# Patient Record
Sex: FEMALE | Race: BLACK OR AFRICAN AMERICAN | NOT HISPANIC OR LATINO | Employment: UNEMPLOYED | URBAN - NONMETROPOLITAN AREA
[De-identification: names, ages, dates, MRNs, and addresses within clinical notes are randomized per-mention and may not be internally consistent; named-entity substitution may affect disease eponyms.]

---

## 2020-05-14 LAB
BACTERIA SPEC AEROBE CULT: NORMAL
CANNABINOIDS SERPL QL: POSITIVE
COCAINE SERPL CFM-MCNC: NEGATIVE NG/ML
EXTERNAL ABO GROUPING: NORMAL
EXTERNAL AMPHETAMINE SCREEN URINE: NEGATIVE
EXTERNAL ANTIBODY SCREEN: NORMAL
EXTERNAL BARBITURATE SCREEN URINE: NEGATIVE
EXTERNAL BENZODIAZEPINE SCREEN URINE: NEGATIVE
EXTERNAL HEMATOCRIT: 35 %
EXTERNAL HEMOGLOBIN: 11.9 G/DL
EXTERNAL HEPATITIS B SURFACE ANTIGEN: NEGATIVE
EXTERNAL PHENCYCLIDINE SCREEN URINE: NEGATIVE
EXTERNAL RH FACTOR: POSITIVE
HCV AB S/CO SERPL IA: NEGATIVE
HIV 1+2 AB+HIV1 P24 AG SERPL QL IA: NON REACTIVE
OPIATES UR QL: NEGATIVE
RUBV IGG SERPL IA-ACNC: NORMAL

## 2020-09-17 LAB
EXTERNAL HEMATOCRIT: 31 %
EXTERNAL HEMOGLOBIN: 10.8 G/DL
GLUCOSE 1H P 100 G GLC PO SERPL-MCNC: 122 MG/DL (ref 74–180)

## 2020-09-25 ENCOUNTER — TELEPHONE (OUTPATIENT)
Dept: OBSTETRICS AND GYNECOLOGY | Facility: CLINIC | Age: 30
End: 2020-09-25

## 2020-10-05 ENCOUNTER — LAB (OUTPATIENT)
Dept: LAB | Facility: HOSPITAL | Age: 30
End: 2020-10-05

## 2020-10-05 ENCOUNTER — INITIAL PRENATAL (OUTPATIENT)
Dept: OBSTETRICS AND GYNECOLOGY | Facility: CLINIC | Age: 30
End: 2020-10-05

## 2020-10-05 VITALS
HEIGHT: 62 IN | DIASTOLIC BLOOD PRESSURE: 86 MMHG | WEIGHT: 272 LBS | SYSTOLIC BLOOD PRESSURE: 126 MMHG | BODY MASS INDEX: 50.05 KG/M2

## 2020-10-05 DIAGNOSIS — O99.213 OBESITY AFFECTING PREGNANCY IN THIRD TRIMESTER: ICD-10-CM

## 2020-10-05 DIAGNOSIS — Z23 NEED FOR TDAP VACCINATION: ICD-10-CM

## 2020-10-05 DIAGNOSIS — O09.299 HX OF PREECLAMPSIA, PRIOR PREGNANCY, CURRENTLY PREGNANT: ICD-10-CM

## 2020-10-05 DIAGNOSIS — O09.299 HX OF PREECLAMPSIA, PRIOR PREGNANCY, CURRENTLY PREGNANT: Primary | ICD-10-CM

## 2020-10-05 DIAGNOSIS — O99.333 TOBACCO SMOKING AFFECTING PREGNANCY IN THIRD TRIMESTER: ICD-10-CM

## 2020-10-05 DIAGNOSIS — Z3A.30 30 WEEKS GESTATION OF PREGNANCY: ICD-10-CM

## 2020-10-05 DIAGNOSIS — F12.10 MARIJUANA ABUSE: ICD-10-CM

## 2020-10-05 DIAGNOSIS — O09.43 SUPERVISION OF HIGH-RISK PREGNANCY WITH GRAND MULTIPARITY IN THIRD TRIMESTER: ICD-10-CM

## 2020-10-05 DIAGNOSIS — O99.323 DRUG USE AFFECTING PREGNANCY IN THIRD TRIMESTER: ICD-10-CM

## 2020-10-05 PROCEDURE — 99203 OFFICE O/P NEW LOW 30 MIN: CPT | Performed by: NURSE PRACTITIONER

## 2020-10-05 PROCEDURE — 90715 TDAP VACCINE 7 YRS/> IM: CPT | Performed by: NURSE PRACTITIONER

## 2020-10-05 PROCEDURE — 80306 DRUG TEST PRSMV INSTRMNT: CPT | Performed by: NURSE PRACTITIONER

## 2020-10-05 PROCEDURE — 85027 COMPLETE CBC AUTOMATED: CPT

## 2020-10-05 PROCEDURE — 80053 COMPREHEN METABOLIC PANEL: CPT | Performed by: NURSE PRACTITIONER

## 2020-10-05 PROCEDURE — 90471 IMMUNIZATION ADMIN: CPT | Performed by: NURSE PRACTITIONER

## 2020-10-05 RX ORDER — NICOTINE POLACRILEX 4 MG/1
GUM, CHEWING ORAL
COMMUNITY
Start: 2020-09-17 | End: 2020-10-19 | Stop reason: SDUPTHER

## 2020-10-05 RX ORDER — PNV NO.95/FERROUS FUM/FOLIC AC 28MG-0.8MG
TABLET ORAL
COMMUNITY
End: 2021-08-13

## 2020-10-05 NOTE — PROGRESS NOTES
[facility]  Obstetrics Visit    CHIEF COMPLAINT:  New prenatal visit- Transfer from [facility]; hx reviewed and updated.     HISTORY OF PRESENT ILLNESS:  Jackelyn Lopez is a 30 y.o. y/o  at 30w3d by LMP (Patient's last menstrual period was 2020 (approximate).).  This was an unplanned pregnancy and the patient is supported by her s/o Reji Lundberg.  Reports no nausea or vomiting.  Reports breast tenderness.  She denies any vaginal bleeding.  She has started taking a prenatal vitamin.    REVIEW OF SYSTEMS  Review of Systems   Constitutional: Negative for activity change, appetite change, diaphoresis, fatigue, unexpected weight gain and unexpected weight loss.   Respiratory: Negative for chest tightness and shortness of breath.    Cardiovascular: Negative for chest pain and palpitations.   Gastrointestinal: Negative for abdominal distention, abdominal pain, constipation and diarrhea.   Genitourinary: Negative for breast discharge, breast lump, breast pain, decreased libido, dyspareunia, dysuria, menstrual problem, pelvic pain, vaginal bleeding, vaginal discharge and vaginal pain.   Musculoskeletal: Negative for myalgias.   Skin: Negative for color change, dry skin and skin lesions.   Neurological: Positive for headache. Negative for light-headedness.   Psychiatric/Behavioral: Negative for agitation, dysphoric mood, sleep disturbance, depressed mood and stress. The patient is not nervous/anxious.        PRENATAL RISK FACTORS  10/20 Problems (from 10/05/20 to present)     Problem Noted Resolved    Obesity affecting pregnancy in third trimester 10/5/2020 by Luz Elena Hernandez APRN No    Supervision of high-risk pregnancy with grand multiparity in third trimester 10/5/2020 by Luz Elena Hernandez APRN No    Overview Signed 10/5/2020  3:30 PM by Luz Elena Hernandez APRN     B pos/ Rubella immune / GBS 35wk  Dating: LMP  Genetics: declined  Tdap: given 10/5  Flu: Declined 10/5  Anatomy: [facility] - WNL with  limitations 2/2 body habitus; no f/u done  1h Glucola:  122 @ [facility]  H&H/Plts:   Lab Results   Component Value Date    HGB 10.8 2020    HCT 31 2020     Bottle/BC- uncertain; does not want tubal         Marijuana abuse 10/5/2020 by Luz Elena Hernandez APRN No    Drug use affecting pregnancy in third trimester 10/5/2020 by Luz Elena Hernandez APRN No    Tobacco smoking affecting pregnancy in third trimester 10/5/2020 by Luz Elena Hernandez APRN No    Hx of preeclampsia, prior pregnancy, currently pregnant 10/5/2020 by Luz Elena Hernandez APRN No    Overview Signed 10/5/2020  3:32 PM by Luz Elena Hernandez APRN     Hx of gHTN and preE in 5 other full term pregnancies  Pt to have growth/anatomy scan with [facility] in mid-October and await recommendations for f/u plan.               DATING CRITERIA:  LMP (3/6/2020) -- TAMELA 2020  1TUS Transfer in @ 30w3d    OBSTETRIC HISTORY:  OB History    Para Term  AB Living   9 6 6 0 2 6   SAB TAB Ectopic Molar Multiple Live Births   2         6      # Outcome Date GA Lbr Raheel/2nd Weight Sex Delivery Anes PTL Lv   9 Current            8 Term 18 38w0d  2722 g (6 lb) F Vag-Spont EPI N MILAN      Complications: Gestational hypertension   7 Term 18 37w0d  2722 g (6 lb) M Vag-Spont EPI N MILAN      Complications: Preeclampsia   6 Term 12/15/16 38w0d  3629 g (8 lb) F Vag-Spont EPI N MILAN      Complications: Gestational hypertension   5 Term 05/14/15 37w0d  2722 g (6 lb) F Vag-Spont EPI N MILAN      Complications: Preeclampsia   4 Term 07/30/10 37w0d  2722 g (6 lb) F Vag-Spont EPI N MILAN      Complications: Preeclampsia   3 Term 06 37w0d  2722 g (6 lb) F Vag-Spont EPI N MILAN   2 SAB            1 SAB              GYN HISTORY:  Denies h/o sexually transmitted infections/pelvic inflammatory disease  Denies h/o abnormal pap smears  Last pap smear:  per pt  Last Completed Pap Smear       Status Date      PAP SMEAR No completions recorded        Denies h/o gynecologic  "surgeries, including biopsies of the cervix    PAST MEDICAL HISTORY:  Past Medical History:   Diagnosis Date   • Chlamydia    • Gestational hypertension    • Preeclampsia    • Urogenital trichomoniasis      PAST SURGICAL HISTORY:  Past Surgical History:   Procedure Laterality Date   • CHOLECYSTECTOMY  2012     FAMILY HISTORY:  No family history on file.  SOCIAL HISTORY:  Social History     Socioeconomic History   • Marital status: Single     Spouse name: Not on file   • Number of children: Not on file   • Years of education: Not on file   • Highest education level: Not on file   Tobacco Use   • Smoking status: Current Every Day Smoker     Packs/day: 0.25     Years: 15.00     Pack years: 3.75   • Smokeless tobacco: Never Used   Substance and Sexual Activity   • Alcohol use: Not Currently   • Drug use: Yes     Types: Marijuana   • Sexual activity: Yes     Partners: Male     Birth control/protection: None     GENETIC SCREENING:  Age >36 yo as of TAMELA: no  Thalassemia: no  NTD: no  CHD: no  Down Syndrome/MR/Fragile X/Autism: no  Ashkenazi Rastafarian with Avelino-Sachs, [place], familial dysautonomia: no  Sickle cell disease or trait: no  Hemophilia: no  Muscular dystrophy: no  Cystic fibrosis: no  Shawano's chorea: no  Birth defects: no  Genetic/chromosomal disorders: no    INFECTION HISTORY:  TB exposure: no  HSV: no  Illness since LMP: no  Prior GBS infected child: no  STIs: chlamydia and trich    ALLERGIES:  Allergies   Allergen Reactions   • Azithromycin Hives and Nausea And Vomiting       MEDICATIONS:  Prior to Admission medications    Medication Sig Start Date End Date Taking? Authorizing Provider   Omeprazole 20 MG tablet delayed-release  9/17/20  Yes Isaac Castaneda MD   Prenatal Vit-Fe Fumarate-FA (Prenatal Vitamin and Mineral) 28-0.8 MG tablet Prenatal Vitamin tablet   Take 1 tablet every day by oral route for 90 days.    ProviderIsaac MD       PHYSICAL EXAM:   /86   Ht 157.5 cm (62\")   Wt 123 " kg (272 lb)   LMP 2020 (Approximate)   BMI 49.75 kg/m²   General: Alert, healthy, no distress, well nourished and well developed.  Neurologic: Alert, oriented to person, place, and time.  Gait normal.  Cranial nerves II-XII grossly intact.  HEENT: Normocephalic, atraumatic.  Extraocular muscles intact, pupils equal and reactive x2.    Teeth: Normal hygiene.  Neck: Supple, no adenopathy, thyroid normal size, non-tender, without nodularity, trachea midline.  Breasts: No masses, skin dimpling, skin retraction, nipple discharge, or asymmetry bilaterally.  Lungs: Normal respiratory effort.  Clear to auscultation bilaterally.  No wheezes, rhonci, or rales.  Heart: Regular rate and rhythm.  No murmer, rub or gallop.  Abdomen: Gravid, 34cm  Skin: No rash, no lesions.  Extremities: No cyanosis, clubbing or edema.  PELVIC EXAM:  Deferred.        IMPRESSION:  Jackelyn Lopez is a 30 y.o.  at 30w3d for a new prenatal visit.    PLAN:  1.  IUP at 30w3d  - Options counseling performed and patient desires continuation of pregnancy to term   - Prenatal labs ordered  - Genetic testing, including cystic fibrosis, was discussed and patient declined  - Continue prenatal vitamins  - Weight gain counseling performed.   - Pregravid BMI >30: Recommend 11-20 lb  - Return to clinic in 4 weeks for return prenatal visit  - Reviewed COVID-19 visitation policy  - Reviewed COVID-19 precautions     Diagnosis Plan   1. Hx of preeclampsia, prior pregnancy, currently pregnant  Comprehensive Metabolic Panel    CBC (No Diff)    Protein, Urine, 24 Hour - Urine, Clean Catch    US Ob Follow Up Transabdominal Approach   2. 30 weeks gestation of pregnancy  US Ob Follow Up Transabdominal Approach   3. Tobacco smoking affecting pregnancy in third trimester  US Ob Follow Up Transabdominal Approach  Encouraged continued cessation efforts.   4. Drug use affecting pregnancy in third trimester  Urine Drug Screen - Urine, Clean Catch   5. Marijuana  "abuse  Urine Drug Screen - Urine, Clean Catch  Pt states she hasn't used marijuana \"in a while\"   6. Need for Tdap vaccination  Tdap Vaccine Greater Than or Equal To 8yo IM   7. Obesity affecting pregnancy in third trimester     8. Supervision of high-risk pregnancy with grand multiparity in third trimester       Luz Elena Hernandez, APRN  10/5/2020  15:33 CDT  "

## 2020-10-06 LAB
ALBUMIN SERPL-MCNC: 3.8 G/DL (ref 3.5–5.2)
ALBUMIN/GLOB SERPL: 1.2 G/DL
ALP SERPL-CCNC: 121 U/L (ref 39–117)
ALT SERPL W P-5'-P-CCNC: 19 U/L (ref 1–33)
AMPHET+METHAMPHET UR QL: NEGATIVE
AMPHETAMINES UR QL: NEGATIVE
ANION GAP SERPL CALCULATED.3IONS-SCNC: 9.3 MMOL/L (ref 5–15)
AST SERPL-CCNC: 23 U/L (ref 1–32)
BARBITURATES UR QL SCN: NEGATIVE
BENZODIAZ UR QL SCN: NEGATIVE
BILIRUB SERPL-MCNC: 0.3 MG/DL (ref 0–1.2)
BUN SERPL-MCNC: 5 MG/DL (ref 6–20)
BUN/CREAT SERPL: 7.9 (ref 7–25)
BUPRENORPHINE SERPL-MCNC: NEGATIVE NG/ML
CALCIUM SPEC-SCNC: 9.7 MG/DL (ref 8.6–10.5)
CANNABINOIDS SERPL QL: NEGATIVE
CHLORIDE SERPL-SCNC: 103 MMOL/L (ref 98–107)
CO2 SERPL-SCNC: 21.7 MMOL/L (ref 22–29)
COCAINE UR QL: NEGATIVE
CREAT SERPL-MCNC: 0.63 MG/DL (ref 0.57–1)
DEPRECATED RDW RBC AUTO: 41.3 FL (ref 37–54)
ERYTHROCYTE [DISTWIDTH] IN BLOOD BY AUTOMATED COUNT: 14.5 % (ref 12.3–15.4)
GFR SERPL CREATININE-BSD FRML MDRD: 133 ML/MIN/1.73
GLOBULIN UR ELPH-MCNC: 3.3 GM/DL
GLUCOSE SERPL-MCNC: 96 MG/DL (ref 65–99)
HCT VFR BLD AUTO: 29.4 % (ref 34–46.6)
HGB BLD-MCNC: 10.3 G/DL (ref 12–15.9)
MCH RBC QN AUTO: 27.7 PG (ref 26.6–33)
MCHC RBC AUTO-ENTMCNC: 35 G/DL (ref 31.5–35.7)
MCV RBC AUTO: 79 FL (ref 79–97)
METHADONE UR QL SCN: NEGATIVE
OPIATES UR QL: NEGATIVE
OXYCODONE UR QL SCN: NEGATIVE
PCP UR QL SCN: NEGATIVE
PLATELET # BLD AUTO: 170 10*3/MM3 (ref 140–450)
PMV BLD AUTO: 12.7 FL (ref 6–12)
POTASSIUM SERPL-SCNC: 3.8 MMOL/L (ref 3.5–5.2)
PROPOXYPH UR QL: NEGATIVE
PROT SERPL-MCNC: 7.1 G/DL (ref 6–8.5)
RBC # BLD AUTO: 3.72 10*6/MM3 (ref 3.77–5.28)
SODIUM SERPL-SCNC: 134 MMOL/L (ref 136–145)
TRICYCLICS UR QL SCN: NEGATIVE
WBC # BLD AUTO: 9.1 10*3/MM3 (ref 3.4–10.8)

## 2020-10-07 ENCOUNTER — LAB (OUTPATIENT)
Dept: LAB | Facility: HOSPITAL | Age: 30
End: 2020-10-07

## 2020-10-07 DIAGNOSIS — O09.299 HX OF PREECLAMPSIA, PRIOR PREGNANCY, CURRENTLY PREGNANT: ICD-10-CM

## 2020-10-07 PROCEDURE — 84156 ASSAY OF PROTEIN URINE: CPT

## 2020-10-07 PROCEDURE — 81050 URINALYSIS VOLUME MEASURE: CPT

## 2020-10-08 LAB — PROT 24H UR-MRATE: 190 MG/24HOURS (ref 0–150)

## 2020-10-19 ENCOUNTER — ROUTINE PRENATAL (OUTPATIENT)
Dept: OBSTETRICS AND GYNECOLOGY | Facility: CLINIC | Age: 30
End: 2020-10-19

## 2020-10-19 VITALS — DIASTOLIC BLOOD PRESSURE: 80 MMHG | WEIGHT: 270 LBS | SYSTOLIC BLOOD PRESSURE: 122 MMHG | BODY MASS INDEX: 49.38 KG/M2

## 2020-10-19 DIAGNOSIS — O99.323 DRUG USE AFFECTING PREGNANCY IN THIRD TRIMESTER: ICD-10-CM

## 2020-10-19 DIAGNOSIS — O09.299 HX OF PREECLAMPSIA, PRIOR PREGNANCY, CURRENTLY PREGNANT: ICD-10-CM

## 2020-10-19 DIAGNOSIS — Z3A.32 32 WEEKS GESTATION OF PREGNANCY: ICD-10-CM

## 2020-10-19 DIAGNOSIS — O99.213 OBESITY AFFECTING PREGNANCY IN THIRD TRIMESTER: ICD-10-CM

## 2020-10-19 DIAGNOSIS — F12.10 MARIJUANA ABUSE: ICD-10-CM

## 2020-10-19 DIAGNOSIS — O09.43 SUPERVISION OF HIGH-RISK PREGNANCY WITH GRAND MULTIPARITY IN THIRD TRIMESTER: Primary | ICD-10-CM

## 2020-10-19 DIAGNOSIS — O99.333 TOBACCO SMOKING AFFECTING PREGNANCY IN THIRD TRIMESTER: ICD-10-CM

## 2020-10-19 PROBLEM — O98.513 HERPES VIRUS INFECTION IN MOTHER DURING THIRD TRIMESTER OF PREGNANCY: Status: ACTIVE | Noted: 2020-10-19

## 2020-10-19 PROBLEM — B00.9 HERPES VIRUS INFECTION IN MOTHER DURING THIRD TRIMESTER OF PREGNANCY: Status: ACTIVE | Noted: 2020-10-19

## 2020-10-19 PROBLEM — A60.00 GENITAL HERPES SIMPLEX: Status: ACTIVE | Noted: 2020-10-19

## 2020-10-19 PROCEDURE — 99214 OFFICE O/P EST MOD 30 MIN: CPT | Performed by: NURSE PRACTITIONER

## 2020-10-19 RX ORDER — OMEPRAZOLE 40 MG/1
40 CAPSULE, DELAYED RELEASE ORAL DAILY
Qty: 30 CAPSULE | Refills: 12 | Status: SHIPPED | OUTPATIENT
Start: 2020-10-19 | End: 2022-01-04 | Stop reason: SDUPTHER

## 2020-10-19 NOTE — PROGRESS NOTES
CC: Prenatal visit; hx reviewed, no changes.     Jackelyn Lopez is a 30 y.o.  at 32w3d.  Doing well.  Denies dysuria, abnormal vaginal d/c, constipation, regular contractions, LOF, or VB.  Reports good FM. Having heartburn despite taking omeprazole daily. Having frequent headaches but they are relieved by tylenol use.     /80   Wt 122 kg (270 lb)   LMP 2020 (Approximate)   BMI 49.38 kg/m²   SVE: NA  Baseline PreE labs were WNL.  Fundal Height (cm): 34 cm  Fetal Heart Rate: 158     Growth scan preliminary report reviewed. EFW- 1880g, 31%tile. Size c/w assigned dates. BRADEN- 11.16cm. Suboptimal views of the left had. All other anatomy seen was noted to appear normal at this time. Placenta is posterior without previa with normal insertion of a 3VC.    10/20 Problems (from 10/05/20 to present)     Problem Noted Resolved    Herpes virus infection in mother during third trimester of pregnancy 10/19/2020 by Luz Elena Hernandez APRN No    Overview Addendum 10/19/2020 10:37 AM by Luz Elena Hernandez APRN     PPX at 35 wks         Obesity affecting pregnancy in third trimester 10/5/2020 by Luz Elena Hernandez APRN No    Supervision of high-risk pregnancy with grand multiparity in third trimester 10/5/2020 by Luz Elena Hernandez APRN No    Overview Addendum 10/19/2020 10:34 AM by Luz Elena Hernandez APRN     B pos/ Rubella immune / GBS 35wk  Dating: LMP  Genetics: declined  Tdap: given 10/5  Flu: Declined 10/5  Anatomy: [facility] - WNL with limitations 2/2 body habitus; f/u done 10/19  1h Glucola:  122 @ [facility]  H&H/Plts:   Lab Results   Component Value Date    HGB 10.8 2020    HCT 31 2020     Bottle/BC- uncertain; does not want tubal         Marijuana abuse 10/5/2020 by Luz Elena Hernandez APRN No    Drug use affecting pregnancy in third trimester 10/5/2020 by Luz Elena Hernandez APRN No    Tobacco smoking affecting pregnancy in third trimester 10/5/2020 by Luz Elena Hernandez, APRN No    Overview Signed 10/5/2020  3:34 PM  by Luz Elena Hernandez APRN     3cig/day from 1ppd prior to pregnancy         Hx of preeclampsia, prior pregnancy, currently pregnant 10/5/2020 by Luz Elena Hernandez APRN No    Overview Signed 10/5/2020  3:32 PM by Luz Elena Hernandez APRN     Hx of gHTN and preE in 5/6 other full term pregnancies  Pt to have growth/anatomy scan with [facility] in mid-October and await recommendations for f/u plan.               A/P: Jackelyn Lopez is a 30 y.o.  at 32w3d.  - RTC in 3 weeks for appt with Dr. Mtz  - Reviewed COVID-19 visitation policy  - Reviewed COVID-19 precautions     Diagnosis Plan   1. Supervision of high-risk pregnancy with grand multiparity in third trimester     2. Obesity affecting pregnancy in third trimester     3. Marijuana abuse     4. Drug use affecting pregnancy in third trimester     5. Tobacco smoking affecting pregnancy in third trimester  Cessation encouraged.   6. Hx of preeclampsia, prior pregnancy, currently pregnant  MFM recommendations pending with final report.   7. 32 weeks gestation of pregnancy       POPPY Kilpatrick  10/19/2020  10:38 CDT

## 2020-10-21 ENCOUNTER — LAB (OUTPATIENT)
Dept: LAB | Facility: HOSPITAL | Age: 30
End: 2020-10-21

## 2020-10-21 DIAGNOSIS — O16.3 ELEVATED BLOOD PRESSURE AFFECTING PREGNANCY IN THIRD TRIMESTER, ANTEPARTUM: Primary | ICD-10-CM

## 2020-10-21 DIAGNOSIS — O16.3 ELEVATED BLOOD PRESSURE AFFECTING PREGNANCY IN THIRD TRIMESTER, ANTEPARTUM: ICD-10-CM

## 2020-10-21 PROCEDURE — 85027 COMPLETE CBC AUTOMATED: CPT

## 2020-10-21 PROCEDURE — 82570 ASSAY OF URINE CREATININE: CPT

## 2020-10-21 PROCEDURE — 80053 COMPREHEN METABOLIC PANEL: CPT

## 2020-10-21 PROCEDURE — 84156 ASSAY OF PROTEIN URINE: CPT

## 2020-10-22 ENCOUNTER — ROUTINE PRENATAL (OUTPATIENT)
Dept: OBSTETRICS AND GYNECOLOGY | Facility: CLINIC | Age: 30
End: 2020-10-22

## 2020-10-22 VITALS — BODY MASS INDEX: 49.57 KG/M2 | DIASTOLIC BLOOD PRESSURE: 100 MMHG | WEIGHT: 271 LBS | SYSTOLIC BLOOD PRESSURE: 180 MMHG

## 2020-10-22 DIAGNOSIS — O99.333 TOBACCO SMOKING AFFECTING PREGNANCY IN THIRD TRIMESTER: ICD-10-CM

## 2020-10-22 DIAGNOSIS — O09.43 SUPERVISION OF HIGH-RISK PREGNANCY WITH GRAND MULTIPARITY IN THIRD TRIMESTER: ICD-10-CM

## 2020-10-22 DIAGNOSIS — Z3A.32 32 WEEKS GESTATION OF PREGNANCY: ICD-10-CM

## 2020-10-22 DIAGNOSIS — O99.213 OBESITY AFFECTING PREGNANCY IN THIRD TRIMESTER: ICD-10-CM

## 2020-10-22 DIAGNOSIS — O09.299 HX OF PREECLAMPSIA, PRIOR PREGNANCY, CURRENTLY PREGNANT: ICD-10-CM

## 2020-10-22 DIAGNOSIS — O16.3 MATERNAL HYPERTENSION IN THIRD TRIMESTER: Primary | ICD-10-CM

## 2020-10-22 LAB
ALBUMIN SERPL-MCNC: 3.5 G/DL (ref 3.5–5.2)
ALBUMIN/GLOB SERPL: 1.1 G/DL
ALP SERPL-CCNC: 143 U/L (ref 39–117)
ALT SERPL W P-5'-P-CCNC: 19 U/L (ref 1–33)
ANION GAP SERPL CALCULATED.3IONS-SCNC: 9.2 MMOL/L (ref 5–15)
AST SERPL-CCNC: 24 U/L (ref 1–32)
BILIRUB SERPL-MCNC: 0.2 MG/DL (ref 0–1.2)
BUN SERPL-MCNC: 4 MG/DL (ref 6–20)
BUN/CREAT SERPL: 8.7 (ref 7–25)
CALCIUM SPEC-SCNC: 8.9 MG/DL (ref 8.6–10.5)
CHLORIDE SERPL-SCNC: 106 MMOL/L (ref 98–107)
CO2 SERPL-SCNC: 21.8 MMOL/L (ref 22–29)
CREAT SERPL-MCNC: 0.46 MG/DL (ref 0.57–1)
CREAT UR-MCNC: 131.8 MG/DL
DEPRECATED RDW RBC AUTO: 40.2 FL (ref 37–54)
ERYTHROCYTE [DISTWIDTH] IN BLOOD BY AUTOMATED COUNT: 14.6 % (ref 12.3–15.4)
GFR SERPL CREATININE-BSD FRML MDRD: >150 ML/MIN/1.73
GLOBULIN UR ELPH-MCNC: 3.2 GM/DL
GLUCOSE SERPL-MCNC: 113 MG/DL (ref 65–99)
HCT VFR BLD AUTO: 30 % (ref 34–46.6)
HGB BLD-MCNC: 10.2 G/DL (ref 12–15.9)
MCH RBC QN AUTO: 26.3 PG (ref 26.6–33)
MCHC RBC AUTO-ENTMCNC: 34 G/DL (ref 31.5–35.7)
MCV RBC AUTO: 77.3 FL (ref 79–97)
PLATELET # BLD AUTO: 149 10*3/MM3 (ref 140–450)
PMV BLD AUTO: 11.9 FL (ref 6–12)
POTASSIUM SERPL-SCNC: 3.6 MMOL/L (ref 3.5–5.2)
PROT SERPL-MCNC: 6.7 G/DL (ref 6–8.5)
PROT UR-MCNC: 14 MG/DL
PROT/CREAT UR: 106.2 MG/G CREA (ref 0–200)
RBC # BLD AUTO: 3.88 10*6/MM3 (ref 3.77–5.28)
SODIUM SERPL-SCNC: 137 MMOL/L (ref 136–145)
WBC # BLD AUTO: 8.68 10*3/MM3 (ref 3.4–10.8)

## 2020-10-22 PROCEDURE — 99213 OFFICE O/P EST LOW 20 MIN: CPT | Performed by: NURSE PRACTITIONER

## 2020-10-22 RX ORDER — NIFEDIPINE 30 MG/1
30 TABLET, EXTENDED RELEASE ORAL DAILY
Qty: 30 TABLET | Refills: 1 | Status: SHIPPED | OUTPATIENT
Start: 2020-10-22 | End: 2020-10-26 | Stop reason: SDUPTHER

## 2020-10-22 NOTE — PROGRESS NOTES
CC: Prenatal visit; hx reviewed, no changes.     Jackelyn Lopez is a 30 y.o.  at 32w6d.  Doing well.  Denies dysuria, abnormal vaginal d/c, heartburn, contractions, LOF, or VB.  Reports good FM. Having headaches but denies any visual changes or facial swelling. BP at home has been ranging from 140-160s/ since yesterday morning.    /100   Wt 123 kg (271 lb)   LMP 2020 (Approximate)   BMI 49.57 kg/m²   SVE: NA  Reflexes 1+     Fetal Heart Rate: 150    10/20 Problems (from 10/05/20 to present)     Problem Noted Resolved    Maternal hypertension in third trimester 10/22/2020 by Luz Elena Hernandez APRN No    Overview Signed 10/22/2020  2:01 PM by Luz Elena Hernandez APRN     10/22- Start Nifedipine XL 30mg daily recheck BP in 4 days         Herpes virus infection in mother during third trimester of pregnancy 10/19/2020 by Luz Elena Hernandez APRN No    Overview Addendum 10/19/2020 10:37 AM by Luz Elena Hernandez APRN     PPX at 35 wks         Obesity affecting pregnancy in third trimester 10/5/2020 by Luz Elena Hernandez APRN No    Supervision of high-risk pregnancy with grand multiparity in third trimester 10/5/2020 by Luz Elena Hernandez APRN No    Overview Addendum 10/19/2020 10:34 AM by Luz Elena Hernandez APRN     B pos/ Rubella immune / GBS 35wk  Dating: LMP  Genetics: declined  Tdap: given 10/5  Flu: Declined 10/5  Anatomy: [facility] - WNL with limitations 2/2 body habitus; f/u done 10/19  1h Glucola:  122 @ [facility]  H&H/Plts:   Lab Results   Component Value Date    HGB 10.8 2020    HCT 31 2020     Bottle/BC- uncertain; does not want tubal         Marijuana abuse 10/5/2020 by Luz Elena Hernandez APRN No    Drug use affecting pregnancy in third trimester 10/5/2020 by Luz Elena Hernandez APRN No    Tobacco smoking affecting pregnancy in third trimester 10/5/2020 by Luz Elena Hernandez APRN No    Overview Signed 10/5/2020  3:34 PM by Luz Elena Hernandez, POPPY     3cig/day from 1ppd prior to pregnancy         Hx of  preeclampsia, prior pregnancy, currently pregnant 10/5/2020 by Luz Elena Hernandez APRN No    Overview Signed 10/5/2020  3:32 PM by Luz Elena Hernandez APRN     Hx of gHTN and preE in 5/6 other full term pregnancies  Pt to have growth/anatomy scan with [facility] in mid-October and await recommendations for f/u plan.               A/P: Jackelyn Lopez is a 30 y.o.  at 32w6d.  - RTC in 4 days  - Reviewed COVID-19 visitation policy  - Reviewed COVID-19 precautions     Diagnosis Plan   1. Maternal hypertension in third trimester  Nifedipine XL 30mg daily; RBA and potential s/e reviewed.     PreE labs pending from Wednesday.   2. Supervision of high-risk pregnancy with grand multiparity in third trimester     3. Obesity affecting pregnancy in third trimester     4. Tobacco smoking affecting pregnancy in third trimester     5. Hx of preeclampsia, prior pregnancy, currently pregnant     6. 32 weeks gestation of pregnancy       POPPY Kilpatrick  10/22/2020  14:01 CDT

## 2020-10-23 ENCOUNTER — TELEPHONE (OUTPATIENT)
Dept: OBSTETRICS AND GYNECOLOGY | Facility: CLINIC | Age: 30
End: 2020-10-23

## 2020-10-23 NOTE — TELEPHONE ENCOUNTER
----- Message from POPPY Iniguez sent at 10/23/2020  8:07 AM CDT -----  Liver enzymes are increasing slightly and platelets have dropped slightly but protein in urine is still in normal range. Will plan for weekly labs to monitor.

## 2020-10-23 NOTE — TELEPHONE ENCOUNTER
Ob patient called and said she read her My Chart and it had some discrepancies,in there which she did not agree with..had her using drugs and she did not,talked with my Supervisor on who she needed to talk to.She said send her to medical records and they would talk to the doctor abt the discrepancies...

## 2020-10-26 ENCOUNTER — ROUTINE PRENATAL (OUTPATIENT)
Dept: OBSTETRICS AND GYNECOLOGY | Facility: CLINIC | Age: 30
End: 2020-10-26

## 2020-10-26 VITALS — BODY MASS INDEX: 49.02 KG/M2 | DIASTOLIC BLOOD PRESSURE: 90 MMHG | SYSTOLIC BLOOD PRESSURE: 130 MMHG | WEIGHT: 268 LBS

## 2020-10-26 DIAGNOSIS — Z3A.33 33 WEEKS GESTATION OF PREGNANCY: ICD-10-CM

## 2020-10-26 DIAGNOSIS — O09.43 SUPERVISION OF HIGH-RISK PREGNANCY WITH GRAND MULTIPARITY IN THIRD TRIMESTER: ICD-10-CM

## 2020-10-26 DIAGNOSIS — O09.299 HX OF PREECLAMPSIA, PRIOR PREGNANCY, CURRENTLY PREGNANT: ICD-10-CM

## 2020-10-26 DIAGNOSIS — O16.3 MATERNAL HYPERTENSION IN THIRD TRIMESTER: Primary | ICD-10-CM

## 2020-10-26 DIAGNOSIS — O99.213 OBESITY AFFECTING PREGNANCY IN THIRD TRIMESTER: ICD-10-CM

## 2020-10-26 DIAGNOSIS — O99.333 TOBACCO SMOKING AFFECTING PREGNANCY IN THIRD TRIMESTER: ICD-10-CM

## 2020-10-26 DIAGNOSIS — O99.323 DRUG USE AFFECTING PREGNANCY IN THIRD TRIMESTER: ICD-10-CM

## 2020-10-26 DIAGNOSIS — F12.11 MARIJUANA ABUSE IN REMISSION: ICD-10-CM

## 2020-10-26 PROBLEM — O98.513 HERPES VIRUS INFECTION IN MOTHER DURING THIRD TRIMESTER OF PREGNANCY: Status: RESOLVED | Noted: 2020-10-19 | Resolved: 2020-10-26

## 2020-10-26 PROBLEM — B00.9 HERPES VIRUS INFECTION IN MOTHER DURING THIRD TRIMESTER OF PREGNANCY: Status: RESOLVED | Noted: 2020-10-19 | Resolved: 2020-10-26

## 2020-10-26 PROCEDURE — 99214 OFFICE O/P EST MOD 30 MIN: CPT | Performed by: NURSE PRACTITIONER

## 2020-10-26 RX ORDER — NIFEDIPINE 60 MG/1
60 TABLET, FILM COATED, EXTENDED RELEASE ORAL DAILY
Qty: 30 TABLET | Refills: 1 | Status: SHIPPED | OUTPATIENT
Start: 2020-10-26 | End: 2021-03-26

## 2020-10-26 NOTE — PROGRESS NOTES
CC: Blood pressure recheck; started nifedipine last Thursday    Jackelyn Lopez is a 30 y.o.  at 33w3d.  Doing well.  Denies dysuria, abnormal vaginal d/c, heartburn, constipation, contractions, LOF, or VB.  Reports good FM. Still having some frequent headaches but denies any visual changes. Feeling very tired after starting BP med.    /90   Wt 122 kg (268 lb)   LMP 2020 (Approximate)   BMI 49.02 kg/m²   SVE: NA    BP log    10/23- 136//100  10/24- 134/91- 153/90  10/25- 149/99- 136/96     Fetal Heart Rate: 140    10/20 Problems (from 10/05/20 to present)     Problem Noted Resolved    Maternal hypertension in third trimester 10/22/2020 by Luz Elena Hernandez APRN No    Overview Addendum 10/26/2020  9:40 AM by Luz Elena Hernandez APRN     10/22- Start Nifedipine XL 30mg daily recheck BP in 4 days  Weekly PreE labs  Weekly BPPs with growth every 3 weeks.         Obesity affecting pregnancy in third trimester 10/5/2020 by Luz Elena Hernandez APRN No    Supervision of high-risk pregnancy with grand multiparity in third trimester 10/5/2020 by Luz Elena Hernandez APRN No    Overview Addendum 10/19/2020 10:34 AM by Luz Elena Hernandez APRN     B pos/ Rubella immune / GBS 35wk  Dating: LMP  Genetics: declined  Tdap: given 10/5  Flu: Declined 10/5  Anatomy: [facility] - WNL with limitations 2/2 body habitus; f/u done 10/19  1h Glucola:  122 @ [facility]  H&H/Plts:   Lab Results   Component Value Date    HGB 10.8 2020    HCT 31 2020     Bottle/BC- uncertain; does not want tubal         Marijuana abuse in remission 10/5/2020 by Luz Elena Hernandez APRN No    Drug use affecting pregnancy in third trimester 10/5/2020 by Luz Elena Hernandez APRN No    Tobacco smoking affecting pregnancy in third trimester 10/5/2020 by Luz Elena Hernandez APRN No    Overview Signed 10/5/2020  3:34 PM by Luz Elena eHrnandez APRN     3cig/day from 1ppd prior to pregnancy         Hx of preeclampsia, prior pregnancy, currently pregnant 10/5/2020 by  Luz Elena Hernandez APRN No    Overview Addendum 10/26/2020  9:39 AM by Luz Elena Hernandez APRN     Hx of gHTN and preE in 5/6 other full term pregnancies  Weekly BPPs               A/P: Jackelyn Lopez is a 30 y.o.  at 33w3d.  - RTC in 1 week  - Reviewed COVID-19 visitation policy  - Reviewed COVID-19 precautions     Diagnosis Plan   1. Maternal hypertension in third trimester  US Fetal Biophysical Profile;Without Non-Stress Testing    Increased Nifedipine XL to 60mg daily  Continue to log AM/PM BP and bring log to every visit.        3. Supervision of high-risk pregnancy with grand multiparity in third trimester  US Fetal Biophysical Profile;Without Non-Stress Testing   4. Obesity affecting pregnancy in third trimester     5. Marijuana abuse in remission     6. Drug use affecting pregnancy in third trimester     7. Tobacco smoking affecting pregnancy in third trimester  US Fetal Biophysical Profile;Without Non-Stress Testing   8. Hx of preeclampsia, prior pregnancy, currently pregnant  US Fetal Biophysical Profile;Without Non-Stress Testing   9. 33 weeks gestation of pregnancy  US Fetal Biophysical Profile;Without Non-Stress Testing     POPPY Kilpatrick  10/26/2020  09:40 CDT     no dysuria/no hematuria

## 2020-10-30 DIAGNOSIS — O09.299 HX OF PREECLAMPSIA, PRIOR PREGNANCY, CURRENTLY PREGNANT: ICD-10-CM

## 2020-10-30 DIAGNOSIS — O99.333 TOBACCO SMOKING AFFECTING PREGNANCY IN THIRD TRIMESTER: ICD-10-CM

## 2020-10-30 DIAGNOSIS — Z3A.30 30 WEEKS GESTATION OF PREGNANCY: ICD-10-CM

## 2020-11-05 ENCOUNTER — ROUTINE PRENATAL (OUTPATIENT)
Dept: OBSTETRICS AND GYNECOLOGY | Facility: CLINIC | Age: 30
End: 2020-11-05

## 2020-11-05 ENCOUNTER — LAB (OUTPATIENT)
Dept: LAB | Facility: HOSPITAL | Age: 30
End: 2020-11-05

## 2020-11-05 VITALS — SYSTOLIC BLOOD PRESSURE: 144 MMHG | DIASTOLIC BLOOD PRESSURE: 88 MMHG | WEIGHT: 268 LBS | BODY MASS INDEX: 49.02 KG/M2

## 2020-11-05 DIAGNOSIS — O99.333 TOBACCO SMOKING AFFECTING PREGNANCY IN THIRD TRIMESTER: ICD-10-CM

## 2020-11-05 DIAGNOSIS — O99.213 OBESITY AFFECTING PREGNANCY IN THIRD TRIMESTER: ICD-10-CM

## 2020-11-05 DIAGNOSIS — O09.299 HX OF PREECLAMPSIA, PRIOR PREGNANCY, CURRENTLY PREGNANT: ICD-10-CM

## 2020-11-05 DIAGNOSIS — O16.3 MATERNAL HYPERTENSION IN THIRD TRIMESTER: Primary | ICD-10-CM

## 2020-11-05 DIAGNOSIS — Z3A.34 34 WEEKS GESTATION OF PREGNANCY: Primary | ICD-10-CM

## 2020-11-05 DIAGNOSIS — O99.323 DRUG USE AFFECTING PREGNANCY IN THIRD TRIMESTER: ICD-10-CM

## 2020-11-05 DIAGNOSIS — O16.3 MATERNAL HYPERTENSION IN THIRD TRIMESTER: ICD-10-CM

## 2020-11-05 DIAGNOSIS — O09.43 SUPERVISION OF HIGH-RISK PREGNANCY WITH GRAND MULTIPARITY IN THIRD TRIMESTER: ICD-10-CM

## 2020-11-05 DIAGNOSIS — F12.11 MARIJUANA ABUSE IN REMISSION: ICD-10-CM

## 2020-11-05 PROCEDURE — 85027 COMPLETE CBC AUTOMATED: CPT

## 2020-11-05 PROCEDURE — 84156 ASSAY OF PROTEIN URINE: CPT | Performed by: OBSTETRICS & GYNECOLOGY

## 2020-11-05 PROCEDURE — 82570 ASSAY OF URINE CREATININE: CPT | Performed by: OBSTETRICS & GYNECOLOGY

## 2020-11-05 PROCEDURE — 99213 OFFICE O/P EST LOW 20 MIN: CPT | Performed by: OBSTETRICS & GYNECOLOGY

## 2020-11-05 PROCEDURE — 80053 COMPREHEN METABOLIC PANEL: CPT

## 2020-11-05 RX ORDER — PROMETHAZINE HYDROCHLORIDE 25 MG/1
12.5 TABLET ORAL EVERY 6 HOURS PRN
Status: CANCELLED | OUTPATIENT
Start: 2020-11-05

## 2020-11-05 RX ORDER — DEXTROSE, SODIUM CHLORIDE, SODIUM LACTATE, POTASSIUM CHLORIDE, AND CALCIUM CHLORIDE 5; .6; .31; .03; .02 G/100ML; G/100ML; G/100ML; G/100ML; G/100ML
125 INJECTION, SOLUTION INTRAVENOUS CONTINUOUS
Status: CANCELLED | OUTPATIENT
Start: 2020-11-05

## 2020-11-05 RX ORDER — BUTORPHANOL TARTRATE 1 MG/ML
2 INJECTION, SOLUTION INTRAMUSCULAR; INTRAVENOUS
Status: CANCELLED | OUTPATIENT
Start: 2020-11-05

## 2020-11-05 RX ORDER — OXYTOCIN 10 [USP'U]/ML
650 INJECTION, SOLUTION INTRAMUSCULAR; INTRAVENOUS ONCE
Status: CANCELLED | OUTPATIENT
Start: 2020-11-05

## 2020-11-05 RX ORDER — OXYTOCIN 10 [USP'U]/ML
85 INJECTION, SOLUTION INTRAMUSCULAR; INTRAVENOUS ONCE
Status: CANCELLED | OUTPATIENT
Start: 2020-11-05

## 2020-11-05 RX ORDER — LIDOCAINE HYDROCHLORIDE 10 MG/ML
5 INJECTION, SOLUTION EPIDURAL; INFILTRATION; INTRACAUDAL; PERINEURAL AS NEEDED
Status: CANCELLED | OUTPATIENT
Start: 2020-11-05

## 2020-11-05 RX ORDER — BUTORPHANOL TARTRATE 1 MG/ML
1 INJECTION, SOLUTION INTRAMUSCULAR; INTRAVENOUS
Status: CANCELLED | OUTPATIENT
Start: 2020-11-05

## 2020-11-05 RX ORDER — SODIUM CHLORIDE 0.9 % (FLUSH) 0.9 %
3 SYRINGE (ML) INJECTION EVERY 12 HOURS SCHEDULED
Status: CANCELLED | OUTPATIENT
Start: 2020-11-05

## 2020-11-05 RX ORDER — MISOPROSTOL 100 UG/1
800 TABLET ORAL AS NEEDED
Status: CANCELLED | OUTPATIENT
Start: 2020-11-05

## 2020-11-05 RX ORDER — METHYLERGONOVINE MALEATE 0.2 MG/ML
200 INJECTION INTRAVENOUS ONCE AS NEEDED
Status: CANCELLED | OUTPATIENT
Start: 2020-11-05

## 2020-11-05 RX ORDER — PROMETHAZINE HYDROCHLORIDE 25 MG/1
12.5 SUPPOSITORY RECTAL EVERY 6 HOURS PRN
Status: CANCELLED | OUTPATIENT
Start: 2020-11-05

## 2020-11-05 RX ORDER — SODIUM CHLORIDE 0.9 % (FLUSH) 0.9 %
3-10 SYRINGE (ML) INJECTION AS NEEDED
Status: CANCELLED | OUTPATIENT
Start: 2020-11-05

## 2020-11-05 RX ORDER — CARBOPROST TROMETHAMINE 250 UG/ML
250 INJECTION, SOLUTION INTRAMUSCULAR AS NEEDED
Status: CANCELLED | OUTPATIENT
Start: 2020-11-05

## 2020-11-05 NOTE — PROGRESS NOTES
CC: Prenatal visit    Jackelyn Lopez is a 30 y.o.  at 34w6d.  Doing well.  Denies contractions, LOF, or VB.  Reports good FM.  No severe symptoms of preeclampsia at this time.  Blood pressure is mild range.  She is tolerating the Adalat well.  Discussed with patient that I would recommend delivery at 37 weeks due to gestational hypertension.  We will plan for induction of labor on  secondary to gestational hypertension.    /88   Wt 122 kg (268 lb)   LMP 2020 (Approximate)   BMI 49.02 kg/m²     Fundal Height (cm): 35 cm  Fetal Heart Rate: 145    10/20 Problems (from 10/05/20 to present)     Problem Noted Resolved    Maternal hypertension in third trimester 10/22/2020 by Luz Elena Hernandez APRN No    Overview Addendum 2020 11:58 AM by Nikolai Mtz DO     10/22- Start Nifedipine XL 30mg daily recheck BP in 4 days  Weekly PreE labs  Weekly BPPs with growth every 3 weeks.    Now on Adalat 60 mg daily         Obesity affecting pregnancy in third trimester 10/5/2020 by Luz Elena Hernandez APRN No    Supervision of high-risk pregnancy with grand multiparity in third trimester 10/5/2020 by Luz Elena Hernandez APRN No    Overview Addendum 10/19/2020 10:34 AM by Luz Elena Hernandez APRN     B pos/ Rubella immune / GBS 35wk  Dating: LMP  Genetics: declined  Tdap: given 10/5  Flu: Declined 10/5  Anatomy: [facility] - WNL with limitations 2/2 body habitus; f/u done 10/19  1h Glucola:  122 @ [facility]  H&H/Plts:   Lab Results   Component Value Date    HGB 10.8 2020    HCT 31 2020     Bottle/BC- uncertain; does not want tubal         Marijuana abuse in remission 10/5/2020 by Luz Elena Hernandez APRN No    Drug use affecting pregnancy in third trimester 10/5/2020 by Luz Elena Hernandez APRN No    Tobacco smoking affecting pregnancy in third trimester 10/5/2020 by Luz Elena Hernandez APRN No    Overview Signed 10/5/2020  3:34 PM by Cook, Luz Elena W, APRN     3cig/day from 1ppd prior to pregnancy          Hx of preeclampsia, prior pregnancy, currently pregnant 10/5/2020 by Luz Elena Hernandez APRN No    Overview Addendum 10/26/2020  9:39 AM by Luz Elena Hernandez APRN     Hx of gHTN and preE in 5/6 other full term pregnancies  Weekly BPPs               A/P: Jackelyn Lopez is a 30 y.o.  at 34w6d.  Doing well, pregnancy complicated by gestational hypertension on Adalat 60 mg daily.  Blood pressure is moderately controlled with mild range blood pressure today.  No other symptoms of preeclampsia at this time.  BPP 8 out of 8 today overall patient doing well with reassuring fetal status.  Induction of labor scheduled for  at 37 weeks and 4 days.  Patient is aware that if she starts having symptoms of severe preeclampsia this may need to be done sooner.  Patient to continue weekly BPP's at this time.  Fetal kick count and  labor precautions given.  Preeclampsia precautions given.  We will have patient do labs today.  Return sooner as needed.  - RTC in 1 weeks  - Reviewed COVID-19 visitation policy  - Reviewed COVID-19 precautions     Diagnosis Plan   1. 34 weeks gestation of pregnancy     2. Maternal hypertension in third trimester  CBC (No Diff)    Comprehensive Metabolic Panel    Protein / Creatinine Ratio, Urine - Urine, Clean Catch   3. Supervision of high-risk pregnancy with grand multiparity in third trimester     4. Obesity affecting pregnancy in third trimester     5. Marijuana abuse in remission     6. Drug use affecting pregnancy in third trimester     7. Tobacco smoking affecting pregnancy in third trimester     8. Hx of preeclampsia, prior pregnancy, currently pregnant       Nikolai Mtz DO  2020  12:00 CST

## 2020-11-06 LAB
ALBUMIN SERPL-MCNC: 3.8 G/DL (ref 3.5–5.2)
ALBUMIN/GLOB SERPL: 1.2 G/DL
ALP SERPL-CCNC: 168 U/L (ref 39–117)
ALT SERPL W P-5'-P-CCNC: 18 U/L (ref 1–33)
ANION GAP SERPL CALCULATED.3IONS-SCNC: 10.9 MMOL/L (ref 5–15)
AST SERPL-CCNC: 19 U/L (ref 1–32)
BILIRUB SERPL-MCNC: 0.2 MG/DL (ref 0–1.2)
BUN SERPL-MCNC: 6 MG/DL (ref 6–20)
BUN/CREAT SERPL: 12 (ref 7–25)
CALCIUM SPEC-SCNC: 9.3 MG/DL (ref 8.6–10.5)
CHLORIDE SERPL-SCNC: 104 MMOL/L (ref 98–107)
CO2 SERPL-SCNC: 23.1 MMOL/L (ref 22–29)
CREAT SERPL-MCNC: 0.5 MG/DL (ref 0.57–1)
CREAT UR-MCNC: 141.5 MG/DL
DEPRECATED RDW RBC AUTO: 41.2 FL (ref 37–54)
ERYTHROCYTE [DISTWIDTH] IN BLOOD BY AUTOMATED COUNT: 14.9 % (ref 12.3–15.4)
GFR SERPL CREATININE-BSD FRML MDRD: >150 ML/MIN/1.73
GLOBULIN UR ELPH-MCNC: 3.3 GM/DL
GLUCOSE SERPL-MCNC: 100 MG/DL (ref 65–99)
HCT VFR BLD AUTO: 32.2 % (ref 34–46.6)
HGB BLD-MCNC: 10.7 G/DL (ref 12–15.9)
MCH RBC QN AUTO: 25.9 PG (ref 26.6–33)
MCHC RBC AUTO-ENTMCNC: 33.2 G/DL (ref 31.5–35.7)
MCV RBC AUTO: 78 FL (ref 79–97)
PLATELET # BLD AUTO: 147 10*3/MM3 (ref 140–450)
PMV BLD AUTO: 12.2 FL (ref 6–12)
POTASSIUM SERPL-SCNC: 3.8 MMOL/L (ref 3.5–5.2)
PROT SERPL-MCNC: 7.1 G/DL (ref 6–8.5)
PROT UR-MCNC: 13 MG/DL
PROT/CREAT UR: 91.9 MG/G CREA (ref 0–200)
RBC # BLD AUTO: 4.13 10*6/MM3 (ref 3.77–5.28)
SODIUM SERPL-SCNC: 138 MMOL/L (ref 136–145)
WBC # BLD AUTO: 8.07 10*3/MM3 (ref 3.4–10.8)

## 2020-11-11 ENCOUNTER — ROUTINE PRENATAL (OUTPATIENT)
Dept: OBSTETRICS AND GYNECOLOGY | Facility: CLINIC | Age: 30
End: 2020-11-11

## 2020-11-11 VITALS — BODY MASS INDEX: 49.2 KG/M2 | DIASTOLIC BLOOD PRESSURE: 80 MMHG | SYSTOLIC BLOOD PRESSURE: 130 MMHG | WEIGHT: 269 LBS

## 2020-11-11 DIAGNOSIS — O99.323 DRUG USE AFFECTING PREGNANCY IN THIRD TRIMESTER: ICD-10-CM

## 2020-11-11 DIAGNOSIS — F12.11 MARIJUANA ABUSE IN REMISSION: ICD-10-CM

## 2020-11-11 DIAGNOSIS — O09.43 SUPERVISION OF HIGH-RISK PREGNANCY WITH GRAND MULTIPARITY IN THIRD TRIMESTER: ICD-10-CM

## 2020-11-11 DIAGNOSIS — O99.333 TOBACCO SMOKING AFFECTING PREGNANCY IN THIRD TRIMESTER: ICD-10-CM

## 2020-11-11 DIAGNOSIS — O09.299 HX OF PREECLAMPSIA, PRIOR PREGNANCY, CURRENTLY PREGNANT: ICD-10-CM

## 2020-11-11 DIAGNOSIS — Z36.85 ANTENATAL SCREENING FOR STREPTOCOCCUS B: ICD-10-CM

## 2020-11-11 DIAGNOSIS — Z3A.35 35 WEEKS GESTATION OF PREGNANCY: ICD-10-CM

## 2020-11-11 DIAGNOSIS — O16.3 MATERNAL HYPERTENSION IN THIRD TRIMESTER: Primary | ICD-10-CM

## 2020-11-11 DIAGNOSIS — O99.213 OBESITY AFFECTING PREGNANCY IN THIRD TRIMESTER: ICD-10-CM

## 2020-11-11 PROCEDURE — 59025 FETAL NON-STRESS TEST: CPT | Performed by: NURSE PRACTITIONER

## 2020-11-11 PROCEDURE — 87653 STREP B DNA AMP PROBE: CPT | Performed by: NURSE PRACTITIONER

## 2020-11-11 PROCEDURE — 99214 OFFICE O/P EST MOD 30 MIN: CPT | Performed by: NURSE PRACTITIONER

## 2020-11-12 DIAGNOSIS — O99.333 TOBACCO SMOKING AFFECTING PREGNANCY IN THIRD TRIMESTER: ICD-10-CM

## 2020-11-12 DIAGNOSIS — O09.299 HX OF PREECLAMPSIA, PRIOR PREGNANCY, CURRENTLY PREGNANT: ICD-10-CM

## 2020-11-12 DIAGNOSIS — Z3A.33 33 WEEKS GESTATION OF PREGNANCY: ICD-10-CM

## 2020-11-12 DIAGNOSIS — O09.43 SUPERVISION OF HIGH-RISK PREGNANCY WITH GRAND MULTIPARITY IN THIRD TRIMESTER: ICD-10-CM

## 2020-11-12 DIAGNOSIS — O16.3 MATERNAL HYPERTENSION IN THIRD TRIMESTER: ICD-10-CM

## 2020-11-12 NOTE — PROGRESS NOTES
CC: Prenatal visit; hx reviewed, no changes.     Jackelyn Lopez is a 30 y.o.  at 35w6d.  Doing well.  Denies N/V, dysuria, abnormal vaginal d/c, constipation, regular contractions, LOF, or VB.  Reports good FM. Having frequent headaches still but mostly managed with tylenol; denies visual changes. Heartburn has improved greatly with omeprazole use.     /80   Wt 122 kg (269 lb)   LMP 2020 (Approximate)   BMI 49.20 kg/m²   SVE: NA  GBS obtained.  NST today due to elevated FHR. Reactive. Baseline shift from 180 to 155 with normal accelerations noted.   Fundal Height (cm): 36 cm  Fetal Heart Rate: 180    10/20 Problems (from 10/05/20 to present)     Problem Noted Resolved    Maternal hypertension in third trimester 10/22/2020 by Luz Elena Hernandez APRN No    Overview Addendum 2020  1:50 PM by Luz Elena Hernandez APRN     10/22- Start Nifedipine XL 30mg daily recheck BP in 4 days  Weekly PreE labs  Weekly BPPs with growth every 3 weeks.    Now on Adalat 60 mg daily    Delivery @37 weeks. IOL scheduled for          Obesity affecting pregnancy in third trimester 10/5/2020 by Luz Elena Hernandez APRN No    Supervision of high-risk pregnancy with grand multiparity in third trimester 10/5/2020 by Luz Elena Hernandez APRN No    Overview Addendum 10/19/2020 10:34 AM by Luz Elena Hernandez APRN     B pos/ Rubella immune / GBS 35wk  Dating: LMP  Genetics: declined  Tdap: given 10/5  Flu: Declined 10/5  Anatomy: [facility] - WNL with limitations 2/2 body habitus; f/u done 10/19  1h Glucola:  122 @ [facility]  H&H/Plts:   Lab Results   Component Value Date    HGB 10.8 2020    HCT 31 2020     Bottle/BC- uncertain; does not want tubal         Marijuana abuse in remission 10/5/2020 by Luz Elena Hernandez APRN No    Drug use affecting pregnancy in third trimester 10/5/2020 by Luz Elena Hernandez APRN No    Tobacco smoking affecting pregnancy in third trimester 10/5/2020 by Luz Elena Hernandez, APRN No    Overview Signed  10/5/2020  3:34 PM by Luz Elena Hernandez APRN     3cig/day from 1ppd prior to pregnancy         Hx of preeclampsia, prior pregnancy, currently pregnant 10/5/2020 by Luz Elena Hernandez APRN No    Overview Addendum 10/26/2020  9:39 AM by Luz Elena Hernandez APRN     Hx of gHTN and preE in 5/6 other full term pregnancies  Weekly BPPs               A/P: Jackelyn Lopez is a 30 y.o.  at 35w6d.  - RTC in 1 week  - Reviewed COVID-19 visitation policy  - Reviewed COVID-19 precautions     Diagnosis Plan   1. Maternal hypertension in third trimester  Group B Strep (Molecular) - Swab, Vaginal/Rectum    PreE labs from last week still WNL  Repeat next week.   2. Supervision of high-risk pregnancy with grand multiparity in third trimester  Group B Strep (Molecular) - Swab, Vaginal/Rectum   3. Obesity affecting pregnancy in third trimester     4. Marijuana abuse in remission     5. Drug use affecting pregnancy in third trimester     6. Tobacco smoking affecting pregnancy in third trimester     7. Hx of preeclampsia, prior pregnancy, currently pregnant  Group B Strep (Molecular) - Swab, Vaginal/Rectum   8.  screening for streptococcus B     9. 35 weeks gestation of pregnancy       POPPY Kilpatrick  2020  12:39 CST

## 2020-11-13 ENCOUNTER — HOSPITAL ENCOUNTER (INPATIENT)
Facility: HOSPITAL | Age: 30
LOS: 5 days | Discharge: HOME | End: 2020-11-18
Attending: OBSTETRICS & GYNECOLOGY | Admitting: OBSTETRICS & GYNECOLOGY

## 2020-11-13 DIAGNOSIS — O14.93 PREECLAMPSIA, THIRD TRIMESTER: ICD-10-CM

## 2020-11-13 DIAGNOSIS — O16.3 MATERNAL HYPERTENSION IN THIRD TRIMESTER: ICD-10-CM

## 2020-11-13 DIAGNOSIS — Z98.891 STATUS POST PRIMARY LOW TRANSVERSE CESAREAN SECTION: Primary | ICD-10-CM

## 2020-11-13 PROBLEM — O13.9 GESTATIONAL HYPERTENSION: Status: ACTIVE | Noted: 2020-11-13

## 2020-11-13 LAB
ALBUMIN SERPL-MCNC: 3.8 G/DL (ref 3.5–5.2)
ALBUMIN/GLOB SERPL: 1.1 G/DL
ALP SERPL-CCNC: 181 U/L (ref 39–117)
ALT SERPL W P-5'-P-CCNC: 20 U/L (ref 1–33)
ANION GAP SERPL CALCULATED.3IONS-SCNC: 14 MMOL/L (ref 5–15)
AST SERPL-CCNC: 22 U/L (ref 1–32)
BACTERIA UR QL AUTO: ABNORMAL /HPF
BASOPHILS # BLD AUTO: 0.02 10*3/MM3 (ref 0–0.2)
BASOPHILS NFR BLD AUTO: 0.2 % (ref 0–1.5)
BILIRUB SERPL-MCNC: 0.3 MG/DL (ref 0–1.2)
BILIRUB UR QL STRIP: NEGATIVE
BUN SERPL-MCNC: 5 MG/DL (ref 6–20)
BUN/CREAT SERPL: 9.8 (ref 7–25)
CALCIUM SPEC-SCNC: 9.5 MG/DL (ref 8.6–10.5)
CHLORIDE SERPL-SCNC: 101 MMOL/L (ref 98–107)
CLARITY UR: CLEAR
CO2 SERPL-SCNC: 21 MMOL/L (ref 22–29)
COLOR UR: YELLOW
CREAT SERPL-MCNC: 0.51 MG/DL (ref 0.57–1)
DEPRECATED RDW RBC AUTO: 41.1 FL (ref 37–54)
EOSINOPHIL # BLD AUTO: 0.04 10*3/MM3 (ref 0–0.4)
EOSINOPHIL NFR BLD AUTO: 0.4 % (ref 0.3–6.2)
ERYTHROCYTE [DISTWIDTH] IN BLOOD BY AUTOMATED COUNT: 14.9 % (ref 12.3–15.4)
GFR SERPL CREATININE-BSD FRML MDRD: >150 ML/MIN/1.73
GLOBULIN UR ELPH-MCNC: 3.4 GM/DL
GLUCOSE SERPL-MCNC: 92 MG/DL (ref 65–99)
GLUCOSE UR STRIP-MCNC: NEGATIVE MG/DL
GROUP B STREP, DNA: NEGATIVE
HCT VFR BLD AUTO: 31 % (ref 34–46.6)
HGB BLD-MCNC: 10.5 G/DL (ref 12–15.9)
HGB UR QL STRIP.AUTO: ABNORMAL
HYALINE CASTS UR QL AUTO: ABNORMAL /LPF
IMM GRANULOCYTES # BLD AUTO: 0.03 10*3/MM3 (ref 0–0.05)
IMM GRANULOCYTES NFR BLD AUTO: 0.3 % (ref 0–0.5)
KETONES UR QL STRIP: NEGATIVE
LEUKOCYTE ESTERASE UR QL STRIP.AUTO: NEGATIVE
LYMPHOCYTES # BLD AUTO: 2.07 10*3/MM3 (ref 0.7–3.1)
LYMPHOCYTES NFR BLD AUTO: 21.2 % (ref 19.6–45.3)
MCH RBC QN AUTO: 26 PG (ref 26.6–33)
MCHC RBC AUTO-ENTMCNC: 33.9 G/DL (ref 31.5–35.7)
MCV RBC AUTO: 76.7 FL (ref 79–97)
MONOCYTES # BLD AUTO: 1.02 10*3/MM3 (ref 0.1–0.9)
MONOCYTES NFR BLD AUTO: 10.4 % (ref 5–12)
NEUTROPHILS NFR BLD AUTO: 6.59 10*3/MM3 (ref 1.7–7)
NEUTROPHILS NFR BLD AUTO: 67.5 % (ref 42.7–76)
NITRITE UR QL STRIP: NEGATIVE
NRBC BLD AUTO-RTO: 0 /100 WBC (ref 0–0.2)
PH UR STRIP.AUTO: 7 [PH] (ref 5–9)
PLATELET # BLD AUTO: 167 10*3/MM3 (ref 140–450)
PMV BLD AUTO: 11.5 FL (ref 6–12)
POTASSIUM SERPL-SCNC: 3.3 MMOL/L (ref 3.5–5.2)
PROT SERPL-MCNC: 7.2 G/DL (ref 6–8.5)
PROT UR QL STRIP: NEGATIVE
RBC # BLD AUTO: 4.04 10*6/MM3 (ref 3.77–5.28)
RBC # UR: ABNORMAL /HPF
REF LAB TEST METHOD: ABNORMAL
SODIUM SERPL-SCNC: 136 MMOL/L (ref 136–145)
SP GR UR STRIP: 1.01 (ref 1–1.03)
SQUAMOUS #/AREA URNS HPF: ABNORMAL /HPF
UROBILINOGEN UR QL STRIP: ABNORMAL
WBC # BLD AUTO: 9.77 10*3/MM3 (ref 3.4–10.8)
WBC UR QL AUTO: ABNORMAL /HPF

## 2020-11-13 PROCEDURE — 81001 URINALYSIS AUTO W/SCOPE: CPT | Performed by: OBSTETRICS & GYNECOLOGY

## 2020-11-13 PROCEDURE — 99222 1ST HOSP IP/OBS MODERATE 55: CPT | Performed by: OBSTETRICS & GYNECOLOGY

## 2020-11-13 PROCEDURE — 36415 COLL VENOUS BLD VENIPUNCTURE: CPT | Performed by: OBSTETRICS & GYNECOLOGY

## 2020-11-13 PROCEDURE — 82570 ASSAY OF URINE CREATININE: CPT | Performed by: OBSTETRICS & GYNECOLOGY

## 2020-11-13 PROCEDURE — 80306 DRUG TEST PRSMV INSTRMNT: CPT | Performed by: OBSTETRICS & GYNECOLOGY

## 2020-11-13 PROCEDURE — 80053 COMPREHEN METABOLIC PANEL: CPT | Performed by: OBSTETRICS & GYNECOLOGY

## 2020-11-13 PROCEDURE — 85025 COMPLETE CBC W/AUTO DIFF WBC: CPT | Performed by: OBSTETRICS & GYNECOLOGY

## 2020-11-13 PROCEDURE — 84156 ASSAY OF PROTEIN URINE: CPT | Performed by: OBSTETRICS & GYNECOLOGY

## 2020-11-13 RX ORDER — BUTORPHANOL TARTRATE 1 MG/ML
1 INJECTION, SOLUTION INTRAMUSCULAR; INTRAVENOUS
Status: DISCONTINUED | OUTPATIENT
Start: 2020-11-13 | End: 2020-11-15

## 2020-11-13 RX ORDER — SODIUM CHLORIDE 0.9 % (FLUSH) 0.9 %
3-10 SYRINGE (ML) INJECTION AS NEEDED
Status: DISCONTINUED | OUTPATIENT
Start: 2020-11-13 | End: 2020-11-15

## 2020-11-13 RX ORDER — NIFEDIPINE 60 MG/1
60 TABLET, EXTENDED RELEASE ORAL DAILY
Status: DISCONTINUED | OUTPATIENT
Start: 2020-11-14 | End: 2020-11-18 | Stop reason: HOSPADM

## 2020-11-13 RX ORDER — PROMETHAZINE HYDROCHLORIDE 12.5 MG/1
12.5 SUPPOSITORY RECTAL EVERY 6 HOURS PRN
Status: DISCONTINUED | OUTPATIENT
Start: 2020-11-13 | End: 2020-11-15

## 2020-11-13 RX ORDER — LIDOCAINE HYDROCHLORIDE 10 MG/ML
5 INJECTION, SOLUTION EPIDURAL; INFILTRATION; INTRACAUDAL; PERINEURAL AS NEEDED
Status: DISCONTINUED | OUTPATIENT
Start: 2020-11-13 | End: 2020-11-15

## 2020-11-13 RX ORDER — MAGNESIUM SULFATE HEPTAHYDRATE 40 MG/ML
2 INJECTION, SOLUTION INTRAVENOUS CONTINUOUS
Status: DISPENSED | OUTPATIENT
Start: 2020-11-14 | End: 2020-11-17

## 2020-11-13 RX ORDER — ACETAMINOPHEN 500 MG
1000 TABLET ORAL ONCE
Status: COMPLETED | OUTPATIENT
Start: 2020-11-13 | End: 2020-11-13

## 2020-11-13 RX ORDER — PROMETHAZINE HYDROCHLORIDE 12.5 MG/1
12.5 TABLET ORAL EVERY 6 HOURS PRN
Status: DISCONTINUED | OUTPATIENT
Start: 2020-11-13 | End: 2020-11-15

## 2020-11-13 RX ORDER — DEXTROSE, SODIUM CHLORIDE, SODIUM LACTATE, POTASSIUM CHLORIDE, AND CALCIUM CHLORIDE 5; .6; .31; .03; .02 G/100ML; G/100ML; G/100ML; G/100ML; G/100ML
125 INJECTION, SOLUTION INTRAVENOUS CONTINUOUS
Status: DISCONTINUED | OUTPATIENT
Start: 2020-11-14 | End: 2020-11-15 | Stop reason: HOSPADM

## 2020-11-13 RX ORDER — SODIUM CHLORIDE 0.9 % (FLUSH) 0.9 %
3 SYRINGE (ML) INJECTION EVERY 12 HOURS SCHEDULED
Status: DISCONTINUED | OUTPATIENT
Start: 2020-11-14 | End: 2020-11-15

## 2020-11-13 RX ADMIN — ACETAMINOPHEN 1000 MG: 500 TABLET ORAL at 22:28

## 2020-11-14 ENCOUNTER — ANESTHESIA (OUTPATIENT)
Dept: LABOR AND DELIVERY | Facility: HOSPITAL | Age: 30
End: 2020-11-14

## 2020-11-14 ENCOUNTER — ANESTHESIA EVENT (OUTPATIENT)
Dept: LABOR AND DELIVERY | Facility: HOSPITAL | Age: 30
End: 2020-11-14

## 2020-11-14 PROBLEM — Z98.891 STATUS POST PRIMARY LOW TRANSVERSE CESAREAN SECTION: Status: ACTIVE | Noted: 2020-11-14

## 2020-11-14 LAB
ABO GROUP BLD: NORMAL
AMPHET+METHAMPHET UR QL: NEGATIVE
AMPHETAMINES UR QL: NEGATIVE
BARBITURATES UR QL SCN: NEGATIVE
BENZODIAZ UR QL SCN: NEGATIVE
BLD GP AB SCN SERPL QL: NEGATIVE
BUPRENORPHINE SERPL-MCNC: NEGATIVE NG/ML
CANNABINOIDS SERPL QL: NEGATIVE
COCAINE UR QL: NEGATIVE
CREAT UR-MCNC: 58.9 MG/DL
Lab: NORMAL
METHADONE UR QL SCN: NEGATIVE
OPIATES UR QL: NEGATIVE
OXYCODONE UR QL SCN: NEGATIVE
PCP UR QL SCN: NEGATIVE
PROPOXYPH UR QL: NEGATIVE
PROT UR-MCNC: 6 MG/DL
PROT/CREAT UR: 101.9 MG/G CREA (ref 0–200)
RH BLD: POSITIVE
T&S EXPIRATION DATE: NORMAL
TRICYCLICS UR QL SCN: NEGATIVE

## 2020-11-14 PROCEDURE — 25010000002 ONDANSETRON PER 1 MG: Performed by: OBSTETRICS & GYNECOLOGY

## 2020-11-14 PROCEDURE — 25010000002 FENTANYL CITRATE (PF) 250 MCG/5ML SOLUTION: Performed by: NURSE ANESTHETIST, CERTIFIED REGISTERED

## 2020-11-14 PROCEDURE — 86850 RBC ANTIBODY SCREEN: CPT | Performed by: OBSTETRICS & GYNECOLOGY

## 2020-11-14 PROCEDURE — 86901 BLOOD TYPING SEROLOGIC RH(D): CPT

## 2020-11-14 PROCEDURE — 86900 BLOOD TYPING SEROLOGIC ABO: CPT

## 2020-11-14 PROCEDURE — 25010000002 METHYLERGONOVINE MALEATE PER 0.2 MG: Performed by: OBSTETRICS & GYNECOLOGY

## 2020-11-14 PROCEDURE — 25010000002 FENTANYL CITRATE (PF) 100 MCG/2ML SOLUTION: Performed by: NURSE ANESTHETIST, CERTIFIED REGISTERED

## 2020-11-14 PROCEDURE — 25010000002 MORPHINE PER 10 MG: Performed by: NURSE ANESTHETIST, CERTIFIED REGISTERED

## 2020-11-14 PROCEDURE — 86901 BLOOD TYPING SEROLOGIC RH(D): CPT | Performed by: OBSTETRICS & GYNECOLOGY

## 2020-11-14 PROCEDURE — 25010000003 MAGNESIUM SULFATE 20 GM/500ML SOLUTION: Performed by: OBSTETRICS & GYNECOLOGY

## 2020-11-14 PROCEDURE — 59514 CESAREAN DELIVERY ONLY: CPT | Performed by: OBSTETRICS & GYNECOLOGY

## 2020-11-14 PROCEDURE — 3E033VJ INTRODUCTION OF OTHER HORMONE INTO PERIPHERAL VEIN, PERCUTANEOUS APPROACH: ICD-10-PCS | Performed by: OBSTETRICS & GYNECOLOGY

## 2020-11-14 PROCEDURE — 86900 BLOOD TYPING SEROLOGIC ABO: CPT | Performed by: OBSTETRICS & GYNECOLOGY

## 2020-11-14 PROCEDURE — 94799 UNLISTED PULMONARY SVC/PX: CPT

## 2020-11-14 PROCEDURE — 59514 CESAREAN DELIVERY ONLY: CPT | Performed by: PHYSICIAN ASSISTANT

## 2020-11-14 PROCEDURE — 63710000001 PROMETHAZINE PER 12.5 MG: Performed by: OBSTETRICS & GYNECOLOGY

## 2020-11-14 PROCEDURE — 25010000002 PHENYLEPHRINE PER 1 ML: Performed by: NURSE ANESTHETIST, CERTIFIED REGISTERED

## 2020-11-14 PROCEDURE — C1755 CATHETER, INTRASPINAL: HCPCS | Performed by: NURSE ANESTHETIST, CERTIFIED REGISTERED

## 2020-11-14 PROCEDURE — 88307 TISSUE EXAM BY PATHOLOGIST: CPT

## 2020-11-14 RX ORDER — METHYLERGONOVINE MALEATE 0.2 MG/ML
200 INJECTION INTRAVENOUS ONCE AS NEEDED
Status: COMPLETED | OUTPATIENT
Start: 2020-11-14 | End: 2020-11-14

## 2020-11-14 RX ORDER — LIDOCAINE HYDROCHLORIDE AND EPINEPHRINE 20; 5 MG/ML; UG/ML
INJECTION, SOLUTION EPIDURAL; INFILTRATION; INTRACAUDAL; PERINEURAL AS NEEDED
Status: DISCONTINUED | OUTPATIENT
Start: 2020-11-14 | End: 2020-11-15 | Stop reason: SURG

## 2020-11-14 RX ORDER — CARBOPROST TROMETHAMINE 250 UG/ML
250 INJECTION, SOLUTION INTRAMUSCULAR AS NEEDED
Status: DISCONTINUED | OUTPATIENT
Start: 2020-11-14 | End: 2020-11-16 | Stop reason: HOSPADM

## 2020-11-14 RX ORDER — FENTANYL CITRATE 50 UG/ML
INJECTION, SOLUTION INTRAMUSCULAR; INTRAVENOUS AS NEEDED
Status: DISCONTINUED | OUTPATIENT
Start: 2020-11-14 | End: 2020-11-15 | Stop reason: SURG

## 2020-11-14 RX ORDER — SODIUM CHLORIDE, SODIUM LACTATE, POTASSIUM CHLORIDE, CALCIUM CHLORIDE 600; 310; 30; 20 MG/100ML; MG/100ML; MG/100ML; MG/100ML
INJECTION, SOLUTION INTRAVENOUS CONTINUOUS PRN
Status: DISCONTINUED | OUTPATIENT
Start: 2020-11-14 | End: 2020-11-15 | Stop reason: SURG

## 2020-11-14 RX ORDER — MISOPROSTOL 200 UG/1
1000 TABLET ORAL ONCE
Status: COMPLETED | OUTPATIENT
Start: 2020-11-15 | End: 2020-11-14

## 2020-11-14 RX ORDER — ACETAMINOPHEN 500 MG
1000 TABLET ORAL ONCE
Status: COMPLETED | OUTPATIENT
Start: 2020-11-14 | End: 2020-11-14

## 2020-11-14 RX ORDER — OXYTOCIN/0.9 % SODIUM CHLORIDE 30/500 ML
2-20 PLASTIC BAG, INJECTION (ML) INTRAVENOUS
Status: DISCONTINUED | OUTPATIENT
Start: 2020-11-14 | End: 2020-11-15

## 2020-11-14 RX ORDER — CALCIUM CARBONATE 200(500)MG
2 TABLET,CHEWABLE ORAL 3 TIMES DAILY PRN
Status: DISCONTINUED | OUTPATIENT
Start: 2020-11-14 | End: 2020-11-15 | Stop reason: HOSPADM

## 2020-11-14 RX ORDER — OXYTOCIN/0.9 % SODIUM CHLORIDE 30/500 ML
85 PLASTIC BAG, INJECTION (ML) INTRAVENOUS ONCE
Status: DISCONTINUED | OUTPATIENT
Start: 2020-11-15 | End: 2020-11-18 | Stop reason: HOSPADM

## 2020-11-14 RX ORDER — BUPIVACAINE HCL/0.9 % NACL/PF 0.1 %
2 PLASTIC BAG, INJECTION (ML) EPIDURAL EVERY 8 HOURS
Status: COMPLETED | OUTPATIENT
Start: 2020-11-15 | End: 2020-11-15

## 2020-11-14 RX ORDER — OXYTOCIN/0.9 % SODIUM CHLORIDE 30/500 ML
PLASTIC BAG, INJECTION (ML) INTRAVENOUS
Status: COMPLETED
Start: 2020-11-14 | End: 2020-11-14

## 2020-11-14 RX ORDER — IBUPROFEN 800 MG/1
800 TABLET ORAL EVERY 8 HOURS SCHEDULED
Status: DISCONTINUED | OUTPATIENT
Start: 2020-11-15 | End: 2020-11-18 | Stop reason: HOSPADM

## 2020-11-14 RX ORDER — OXYTOCIN/0.9 % SODIUM CHLORIDE 30/500 ML
650 PLASTIC BAG, INJECTION (ML) INTRAVENOUS ONCE
Status: DISCONTINUED | OUTPATIENT
Start: 2020-11-15 | End: 2020-11-18 | Stop reason: HOSPADM

## 2020-11-14 RX ORDER — EPHEDRINE SULFATE 50 MG/ML
INJECTION, SOLUTION INTRAVENOUS AS NEEDED
Status: DISCONTINUED | OUTPATIENT
Start: 2020-11-14 | End: 2020-11-15 | Stop reason: SURG

## 2020-11-14 RX ORDER — LIDOCAINE HYDROCHLORIDE AND EPINEPHRINE 15; 5 MG/ML; UG/ML
INJECTION, SOLUTION EPIDURAL AS NEEDED
Status: DISCONTINUED | OUTPATIENT
Start: 2020-11-14 | End: 2020-11-15 | Stop reason: SURG

## 2020-11-14 RX ORDER — TRISODIUM CITRATE DIHYDRATE AND CITRIC ACID MONOHYDRATE 500; 334 MG/5ML; MG/5ML
30 SOLUTION ORAL ONCE
Status: COMPLETED | OUTPATIENT
Start: 2020-11-14 | End: 2020-11-14

## 2020-11-14 RX ORDER — TRISODIUM CITRATE DIHYDRATE AND CITRIC ACID MONOHYDRATE 500; 334 MG/5ML; MG/5ML
SOLUTION ORAL
Status: COMPLETED
Start: 2020-11-14 | End: 2020-11-14

## 2020-11-14 RX ORDER — MORPHINE SULFATE 1 MG/ML
INJECTION, SOLUTION EPIDURAL; INTRATHECAL; INTRAVENOUS AS NEEDED
Status: DISCONTINUED | OUTPATIENT
Start: 2020-11-14 | End: 2020-11-15 | Stop reason: SURG

## 2020-11-14 RX ORDER — CEFAZOLIN SODIUM IN 0.9 % NACL 3 G/100 ML
3 INTRAVENOUS SOLUTION, PIGGYBACK (ML) INTRAVENOUS ONCE
Status: COMPLETED | OUTPATIENT
Start: 2020-11-14 | End: 2020-11-14

## 2020-11-14 RX ORDER — ONDANSETRON 2 MG/ML
4 INJECTION INTRAMUSCULAR; INTRAVENOUS ONCE
Status: DISCONTINUED | OUTPATIENT
Start: 2020-11-14 | End: 2020-11-15 | Stop reason: HOSPADM

## 2020-11-14 RX ADMIN — FENTANYL CITRATE 250 MCG: 50 INJECTION, SOLUTION INTRAMUSCULAR; INTRAVENOUS at 20:35

## 2020-11-14 RX ADMIN — PROMETHAZINE HYDROCHLORIDE 12.5 MG: 12.5 TABLET ORAL at 21:17

## 2020-11-14 RX ADMIN — FENTANYL CITRATE 250 MCG: 50 INJECTION, SOLUTION INTRAMUSCULAR; INTRAVENOUS at 13:31

## 2020-11-14 RX ADMIN — Medication 8 ML/HR: at 13:31

## 2020-11-14 RX ADMIN — MISOPROSTOL 1000 MCG: 200 TABLET ORAL at 23:59

## 2020-11-14 RX ADMIN — CALCIUM CARBONATE (ANTACID) CHEW TAB 500 MG 2 TABLET: 500 CHEW TAB at 17:54

## 2020-11-14 RX ADMIN — METHYLERGONOVINE MALEATE 200 MCG: 0.2 INJECTION, SOLUTION INTRAMUSCULAR; INTRAVENOUS at 23:58

## 2020-11-14 RX ADMIN — EPHEDRINE SULFATE 10 MG: 50 INJECTION INTRAVENOUS at 22:57

## 2020-11-14 RX ADMIN — SODIUM CHLORIDE, POTASSIUM CHLORIDE, SODIUM LACTATE AND CALCIUM CHLORIDE: 600; 310; 30; 20 INJECTION, SOLUTION INTRAVENOUS at 22:46

## 2020-11-14 RX ADMIN — MAGNESIUM SULFATE HEPTAHYDRATE 2 G/HR: 40 INJECTION, SOLUTION INTRAVENOUS at 11:37

## 2020-11-14 RX ADMIN — OXYTOCIN-SODIUM CHLORIDE 0.9% IV SOLN 30 UNIT/500ML 2 MILLI-UNITS/MIN: 30-0.9/5 SOLUTION at 03:30

## 2020-11-14 RX ADMIN — SODIUM CITRATE AND CITRIC ACID MONOHYDRATE 30 ML: 500; 334 SOLUTION ORAL at 22:28

## 2020-11-14 RX ADMIN — NIFEDIPINE 60 MG: 60 TABLET, EXTENDED RELEASE ORAL at 09:36

## 2020-11-14 RX ADMIN — OXYTOCIN-SODIUM CHLORIDE 0.9% IV SOLN 30 UNIT/500ML 650 ML/HR: 30-0.9/5 SOLUTION at 22:49

## 2020-11-14 RX ADMIN — ACETAMINOPHEN 1000 MG: 500 TABLET ORAL at 15:54

## 2020-11-14 RX ADMIN — LIDOCAINE HYDROCHLORIDE AND EPINEPHRINE 3 ML: 15; 5 INJECTION, SOLUTION EPIDURAL at 13:25

## 2020-11-14 RX ADMIN — SODIUM CHLORIDE, POTASSIUM CHLORIDE, SODIUM LACTATE AND CALCIUM CHLORIDE 1000 ML: 600; 310; 30; 20 INJECTION, SOLUTION INTRAVENOUS at 00:58

## 2020-11-14 RX ADMIN — FENTANYL CITRATE 100 MCG: 50 INJECTION, SOLUTION INTRAMUSCULAR; INTRAVENOUS at 22:37

## 2020-11-14 RX ADMIN — PHENYLEPHRINE HYDROCHLORIDE 100 MCG: 10 INJECTION INTRAVENOUS at 23:12

## 2020-11-14 RX ADMIN — EPHEDRINE SULFATE 10 MG: 50 INJECTION INTRAVENOUS at 23:06

## 2020-11-14 RX ADMIN — LIDOCAINE HYDROCHLORIDE,EPINEPHRINE BITARTRATE 5 ML: 20; .005 INJECTION, SOLUTION EPIDURAL; INFILTRATION; INTRACAUDAL; PERINEURAL at 22:42

## 2020-11-14 RX ADMIN — Medication 3 MG: at 23:56

## 2020-11-14 RX ADMIN — LIDOCAINE HYDROCHLORIDE,EPINEPHRINE BITARTRATE 5 ML: 20; .005 INJECTION, SOLUTION EPIDURAL; INFILTRATION; INTRACAUDAL; PERINEURAL at 22:37

## 2020-11-14 RX ADMIN — MAGNESIUM SULFATE HEPTAHYDRATE 2 G/HR: 40 INJECTION, SOLUTION INTRAVENOUS at 02:25

## 2020-11-14 RX ADMIN — TRISODIUM CITRATE DIHYDRATE AND CITRIC ACID MONOHYDRATE 30 ML: 500; 334 SOLUTION ORAL at 22:28

## 2020-11-14 RX ADMIN — Medication 8 ML/HR: at 20:35

## 2020-11-14 RX ADMIN — PROMETHAZINE HYDROCHLORIDE 12.5 MG: 12.5 TABLET ORAL at 15:53

## 2020-11-14 RX ADMIN — CEFAZOLIN 3 G: 1 INJECTION, POWDER, FOR SOLUTION INTRAMUSCULAR; INTRAVENOUS; PARENTERAL at 22:42

## 2020-11-14 RX ADMIN — LIDOCAINE HYDROCHLORIDE,EPINEPHRINE BITARTRATE 5 ML: 20; .005 INJECTION, SOLUTION EPIDURAL; INFILTRATION; INTRACAUDAL; PERINEURAL at 22:33

## 2020-11-14 RX ADMIN — ONDANSETRON 8 MG: 2 INJECTION INTRAMUSCULAR; INTRAVENOUS at 01:22

## 2020-11-14 NOTE — ANESTHESIA PREPROCEDURE EVALUATION
Anesthesia Evaluation     NPO Solid Status: > 8 hours  NPO Liquid Status: > 2 hours           Airway   Mallampati: II  TM distance: >3 FB  Neck ROM: full  Possible difficult intubation  Dental - normal exam     Pulmonary - normal exam   Cardiovascular - normal exam    (+) hypertension,       Neuro/Psych  GI/Hepatic/Renal/Endo    (+) morbid obesity,      Musculoskeletal     Abdominal    Substance History      OB/GYN    (+) Pregnant, Preeclampsia, pregnancy induced hypertension        Other                        Anesthesia Plan    ASA 3     epidural       Anesthetic plan, all risks, benefits, and alternatives have been provided, discussed and informed consent has been obtained with: patient.

## 2020-11-14 NOTE — ANESTHESIA PROCEDURE NOTES
Labor Epidural      Patient location during procedure: OB  Performed By  CRNA: Graham Christianson CRNA  Preanesthetic Checklist  Completed: patient identified, site marked, surgical consent, pre-op evaluation, timeout performed, IV checked, risks and benefits discussed and monitors and equipment checked  Prep:  Pt Position:sitting  Sterile Tech:gloves, cap, gown, mask and sterile barrier  Prep:DuraPrep  Monitoring:blood pressure monitoring and continuous pulse oximetry  Epidural Block Procedure:  Approach:midline  Guidance:landmark technique and palpation technique  Location:L3-L4  Needle Type:Tuohy  Needle Gauge:17 G  Loss of Resistance Medium: air  Loss of Resistance: 10cm  Cath Depth at skin:15 cm  Paresthesia: none  Aspiration:negative  Test Dose:negative  Number of Attempts: 1  Post Assessment:  Dressing:occlusive dressing applied and secured with tape  Pt Tolerance:patient tolerated the procedure well with no apparent complications  Complications:no

## 2020-11-14 NOTE — PLAN OF CARE
Goal Outcome Evaluation:  Plan of Care Reviewed With: patient  Progress: no change  Outcome Summary: Pt on pitocin and mag. Pt feeling contractions. BPs WNL, other VSS. Voids well. No adverse affect from mag at this time. FHR WNL. Will continue to monitor.

## 2020-11-15 LAB
BASOPHILS # BLD AUTO: 0.02 10*3/MM3 (ref 0–0.2)
BASOPHILS NFR BLD AUTO: 0.2 % (ref 0–1.5)
DEPRECATED RDW RBC AUTO: 41.2 FL (ref 37–54)
EOSINOPHIL # BLD AUTO: 0.05 10*3/MM3 (ref 0–0.4)
EOSINOPHIL NFR BLD AUTO: 0.4 % (ref 0.3–6.2)
ERYTHROCYTE [DISTWIDTH] IN BLOOD BY AUTOMATED COUNT: 15.1 % (ref 12.3–15.4)
HCT VFR BLD AUTO: 25.5 % (ref 34–46.6)
HGB BLD-MCNC: 8.8 G/DL (ref 12–15.9)
IMM GRANULOCYTES # BLD AUTO: 0.03 10*3/MM3 (ref 0–0.05)
IMM GRANULOCYTES NFR BLD AUTO: 0.3 % (ref 0–0.5)
LYMPHOCYTES # BLD AUTO: 1.6 10*3/MM3 (ref 0.7–3.1)
LYMPHOCYTES NFR BLD AUTO: 13.7 % (ref 19.6–45.3)
MCH RBC QN AUTO: 26 PG (ref 26.6–33)
MCHC RBC AUTO-ENTMCNC: 34.5 G/DL (ref 31.5–35.7)
MCV RBC AUTO: 75.4 FL (ref 79–97)
MONOCYTES # BLD AUTO: 1.12 10*3/MM3 (ref 0.1–0.9)
MONOCYTES NFR BLD AUTO: 9.6 % (ref 5–12)
NEUTROPHILS NFR BLD AUTO: 75.8 % (ref 42.7–76)
NEUTROPHILS NFR BLD AUTO: 8.89 10*3/MM3 (ref 1.7–7)
NRBC BLD AUTO-RTO: 0 /100 WBC (ref 0–0.2)
PLATELET # BLD AUTO: 148 10*3/MM3 (ref 140–450)
PMV BLD AUTO: 11.4 FL (ref 6–12)
RBC # BLD AUTO: 3.38 10*6/MM3 (ref 3.77–5.28)
WBC # BLD AUTO: 11.71 10*3/MM3 (ref 3.4–10.8)

## 2020-11-15 PROCEDURE — C1755 CATHETER, INTRASPINAL: HCPCS

## 2020-11-15 PROCEDURE — 94799 UNLISTED PULMONARY SVC/PX: CPT

## 2020-11-15 PROCEDURE — 99231 SBSQ HOSP IP/OBS SF/LOW 25: CPT | Performed by: OBSTETRICS & GYNECOLOGY

## 2020-11-15 PROCEDURE — 25010000003 MAGNESIUM SULFATE 20 GM/500ML SOLUTION: Performed by: OBSTETRICS & GYNECOLOGY

## 2020-11-15 PROCEDURE — 85025 COMPLETE CBC W/AUTO DIFF WBC: CPT | Performed by: OBSTETRICS & GYNECOLOGY

## 2020-11-15 PROCEDURE — 25010000002 CEFAZOLIN PER 500 MG: Performed by: OBSTETRICS & GYNECOLOGY

## 2020-11-15 PROCEDURE — 25010000002 KETOROLAC TROMETHAMINE PER 15 MG: Performed by: OBSTETRICS & GYNECOLOGY

## 2020-11-15 PROCEDURE — 51703 INSERT BLADDER CATH COMPLEX: CPT

## 2020-11-15 PROCEDURE — 94760 N-INVAS EAR/PLS OXIMETRY 1: CPT

## 2020-11-15 RX ORDER — PRENATAL VIT/IRON FUM/FOLIC AC 27MG-0.8MG
1 TABLET ORAL DAILY
Status: DISCONTINUED | OUTPATIENT
Start: 2020-11-15 | End: 2020-11-18 | Stop reason: HOSPADM

## 2020-11-15 RX ORDER — ACETAMINOPHEN 325 MG/1
650 TABLET ORAL EVERY 4 HOURS PRN
Status: ACTIVE | OUTPATIENT
Start: 2020-11-15 | End: 2020-11-16

## 2020-11-15 RX ORDER — OXYTOCIN/0.9 % SODIUM CHLORIDE 30/500 ML
650 PLASTIC BAG, INJECTION (ML) INTRAVENOUS ONCE
Status: DISCONTINUED | OUTPATIENT
Start: 2020-11-15 | End: 2020-11-15 | Stop reason: SDUPTHER

## 2020-11-15 RX ORDER — NALOXONE HCL 0.4 MG/ML
0.1 VIAL (ML) INJECTION
Status: DISCONTINUED | OUTPATIENT
Start: 2020-11-15 | End: 2020-11-18 | Stop reason: HOSPADM

## 2020-11-15 RX ORDER — OXYCODONE HYDROCHLORIDE 5 MG/1
5 TABLET ORAL EVERY 4 HOURS PRN
Status: DISCONTINUED | OUTPATIENT
Start: 2020-11-15 | End: 2020-11-18 | Stop reason: HOSPADM

## 2020-11-15 RX ORDER — OXYTOCIN/0.9 % SODIUM CHLORIDE 30/500 ML
85 PLASTIC BAG, INJECTION (ML) INTRAVENOUS ONCE
Status: DISCONTINUED | OUTPATIENT
Start: 2020-11-15 | End: 2020-11-15 | Stop reason: SDUPTHER

## 2020-11-15 RX ORDER — SODIUM CHLORIDE, SODIUM LACTATE, POTASSIUM CHLORIDE, CALCIUM CHLORIDE 600; 310; 30; 20 MG/100ML; MG/100ML; MG/100ML; MG/100ML
75 INJECTION, SOLUTION INTRAVENOUS CONTINUOUS
Status: DISCONTINUED | OUTPATIENT
Start: 2020-11-15 | End: 2020-11-18 | Stop reason: HOSPADM

## 2020-11-15 RX ORDER — SIMETHICONE 80 MG
80 TABLET,CHEWABLE ORAL 4 TIMES DAILY PRN
Status: DISCONTINUED | OUTPATIENT
Start: 2020-11-15 | End: 2020-11-18 | Stop reason: HOSPADM

## 2020-11-15 RX ORDER — NALOXONE HCL 0.4 MG/ML
0.04 VIAL (ML) INJECTION
Status: DISCONTINUED | OUTPATIENT
Start: 2020-11-15 | End: 2020-11-15 | Stop reason: SDUPTHER

## 2020-11-15 RX ORDER — ALUMINA, MAGNESIA, AND SIMETHICONE 2400; 2400; 240 MG/30ML; MG/30ML; MG/30ML
15 SUSPENSION ORAL EVERY 4 HOURS PRN
Status: DISCONTINUED | OUTPATIENT
Start: 2020-11-15 | End: 2020-11-18 | Stop reason: HOSPADM

## 2020-11-15 RX ORDER — ACETAMINOPHEN 500 MG
1000 TABLET ORAL EVERY 8 HOURS
Status: DISCONTINUED | OUTPATIENT
Start: 2020-11-15 | End: 2020-11-18 | Stop reason: HOSPADM

## 2020-11-15 RX ORDER — ONDANSETRON 2 MG/ML
4 INJECTION INTRAMUSCULAR; INTRAVENOUS ONCE
Status: DISCONTINUED | OUTPATIENT
Start: 2020-11-15 | End: 2020-11-18 | Stop reason: HOSPADM

## 2020-11-15 RX ORDER — HYDROCORTISONE 25 MG/G
CREAM TOPICAL 3 TIMES DAILY PRN
Status: DISCONTINUED | OUTPATIENT
Start: 2020-11-15 | End: 2020-11-18 | Stop reason: HOSPADM

## 2020-11-15 RX ORDER — DIPHENHYDRAMINE HCL 25 MG
25 CAPSULE ORAL EVERY 4 HOURS PRN
Status: DISCONTINUED | OUTPATIENT
Start: 2020-11-15 | End: 2020-11-18 | Stop reason: HOSPADM

## 2020-11-15 RX ORDER — KETOROLAC TROMETHAMINE 30 MG/ML
30 INJECTION, SOLUTION INTRAMUSCULAR; INTRAVENOUS EVERY 6 HOURS
Status: COMPLETED | OUTPATIENT
Start: 2020-11-15 | End: 2020-11-15

## 2020-11-15 RX ORDER — LANOLIN 100 %
OINTMENT (GRAM) TOPICAL
Status: DISCONTINUED | OUTPATIENT
Start: 2020-11-15 | End: 2020-11-18 | Stop reason: HOSPADM

## 2020-11-15 RX ADMIN — KETOROLAC TROMETHAMINE 30 MG: 30 INJECTION, SOLUTION INTRAMUSCULAR; INTRAVENOUS at 17:01

## 2020-11-15 RX ADMIN — SODIUM CHLORIDE, SODIUM LACTATE, POTASSIUM CHLORIDE, CALCIUM CHLORIDE 75 ML/HR: 600; 310; 30; 20 INJECTION, SOLUTION INTRAVENOUS at 06:17

## 2020-11-15 RX ADMIN — ACETAMINOPHEN 1000 MG: 500 TABLET ORAL at 11:11

## 2020-11-15 RX ADMIN — ACETAMINOPHEN 1000 MG: 500 TABLET ORAL at 03:09

## 2020-11-15 RX ADMIN — PRENATAL VIT W/ FE FUMARATE-FA TAB 27-0.8 MG 1 TABLET: 27-0.8 TAB at 09:59

## 2020-11-15 RX ADMIN — ACETAMINOPHEN 1000 MG: 500 TABLET ORAL at 19:53

## 2020-11-15 RX ADMIN — CEFAZOLIN SODIUM 2 G: 10 INJECTION, POWDER, FOR SOLUTION INTRAVENOUS at 03:09

## 2020-11-15 RX ADMIN — OXYCODONE 5 MG: 5 TABLET ORAL at 23:20

## 2020-11-15 RX ADMIN — CEFAZOLIN SODIUM 2 G: 10 INJECTION, POWDER, FOR SOLUTION INTRAVENOUS at 13:06

## 2020-11-15 RX ADMIN — MAGNESIUM SULFATE HEPTAHYDRATE 2 G/HR: 40 INJECTION, SOLUTION INTRAVENOUS at 13:06

## 2020-11-15 RX ADMIN — OXYCODONE 5 MG: 5 TABLET ORAL at 14:53

## 2020-11-15 RX ADMIN — KETOROLAC TROMETHAMINE 30 MG: 30 INJECTION, SOLUTION INTRAMUSCULAR; INTRAVENOUS at 23:03

## 2020-11-15 RX ADMIN — KETOROLAC TROMETHAMINE 30 MG: 30 INJECTION, SOLUTION INTRAMUSCULAR; INTRAVENOUS at 09:59

## 2020-11-15 RX ADMIN — KETOROLAC TROMETHAMINE 30 MG: 30 INJECTION, SOLUTION INTRAMUSCULAR; INTRAVENOUS at 03:09

## 2020-11-15 NOTE — PLAN OF CARE
Goal Outcome Evaluation:  Plan of Care Reviewed With: patient  Progress: no change  Outcome Summary: Pitocin at 20; Mg infusing. VSS. Epidural in place; cerda catheter in place. Complaints of heartburn while on her right side, received order for TUMS.

## 2020-11-15 NOTE — PROGRESS NOTES
Patient had not made any change was still approximately 4 to 5 cm.  At this time IUPC and FSE were placed.  Upon placement of the IUPC bloody return was noted.  Upon further monitoring baby started having deep variable decelerations.  The IUPC was then removed.  Patient continues to have deep variable decelerations at this time is remained at 5 cm dilated 90% effaced.  We will continue to monitor at this time and has returned to baseline is good and variability is good however if deep variable decelerations continue will move towards  section.

## 2020-11-15 NOTE — OP NOTE
Section Procedure Note    Jackelyn Lopez    2022     Indications: non-reassuring fetal status    Pre-operative Diagnosis: Patient at 36 weeks with preeclampsia with severe features undergoing induction of labor progressed to 5 cm above fetal heart rate decelerations occurred patient was taken the operating room for nonreassuring fetal heart rate status.    Post-operative Diagnosis: As above    PROCEDURES PERFORMED: Procedure(s):   SECTION PRIMARY    SURGEON: Nikolai Mtz DO, FACOG    Assistant: Izabela Snyder CSA was responsible for performing the following activities: Retraction, Suction, Irrigation, Suturing, Closing, Placing Dressing and Delivery of Fetus and their skilled assistance was necessary for the success of this case.     STAFF:   Circulator: Jasmin Akers RN  Scrub Person: Wendy Starkey & KRISTEN Nurse: Mayi Charles RN  NICU Nurse: Rosmery Townsend RN  Assistant: Izabela Snyder CSA    ANESTHESIA: Spinal    ANESTHESIA STAFF:  CRNA: Graham Christianson CRNA    Findings: Normal-appearing uterus tubes and ovaries.  Male infant in the cephalic presentation.    Birth Information  YOB: 2022   Time of birth: 10:49 PM   Delivering clinician: Nikolai Mtz   Sex: male   Delivery type: , Low Transverse   Breech type (if applicable):     Observed anomalies/comments:         Estimated Blood Loss:  1000mL           Drains: Lindsay                 Specimens: Placenta               Complications:  None; patient tolerated the procedure well.           Disposition: Mother Baby Unit           Condition: stable    Procedure Details      After obtaining informed consent, the patient was taken to the operating room where epidural anesthesia was found to be adequate. Preoperative antibiotics were given.  A Lindsay catheter and bilateral sequential compression devices were placed.  She was then prepped and draped in the normal  sterile fashion in the dorsal supine position with a left lateral tilt. A final time out was performed. A Pfannenstiel skin incision was then made with the scalpel and carried through to the underlying layer of fascia.  The fascia was incised in the midline and the incision extended laterally with traction.  The rectus muscles were then  in the midline, and the peritoneum was entered digitally.   The peritoneal incision was then extended superiorly and inferiorly with good visualization of the bladder. Thee retractor was placed without difficulty.    The lower uterine segment scored in a transverse fashion with the scalpel.  The uterus was then entered bluntly and the incision extended w/ traction.  The bladder blade was removed and the infant’s head was elevated to the level of the incision.  Fundal pressure was applied. The head was delivered atraumatically in OA position.  The anterior shoulder, posterior shoulder, and corpus were delivered without difficulty. The infant was handed off to waiting pediatric team.      The placenta was then removed manually, the uterus exteriorized, and cleared of all clots and debris.  The uterine incision was repaired with 0 Vicryl in a running, locking fashion.  An imbricating layer was then performed with 0-0 Monocryl.  The uterine incision was inspected and hemostasis was noted.  Bleeding was noted at the posterior aspect of the uterus filling the posterior cul-de-sac.  Inspection revealed a small laceration on the posterior aspect of the uterus between the 2 uterosacral ligaments.  This was closed using 2-0 chromic in a running locking fashion.  Hemostasis was noted when this was done.  Parmjit was placed over the posterior laceration.  Posterior cul-de-sac was suctioned and uterus returned to the abdomen.  The gutters were cleared of all clots and irrigated with excellent hemostasis noted.  Parmjit was placed over the hysterotomy site to ensure hemostasis.  The  rectus muscles were reapproximated using 2-0 plain gut with 3 interrupted sutures.  The fascia was reapproximated with 0 Vicryl in a running fashion. Sathya's fascia was closed with 3-0 Vicryl in a running fashion. The skin was closed using 4-0 Monocryl suture.    The patient tolerated the procedure well.  Sponge, lap, and needle counts were correct times two. Vaginal sweep was completed.  The patient was taken to the recovery room in stable condition.      This document has been electronically signed by Nikolai Mtz DO on November 14, 2020 23:34 CST

## 2020-11-15 NOTE — PROGRESS NOTES
[facility] Post  Progress Note  Hospital Day: 2  Subjective:  Postop day: 1 Day Post-Op  The patient feels well. Pain is well controlled with current medications. The baby is stable. The patient has not ambulated yet. The patient is attempting to advance diet from soft to regular on her own volition. Flatus has not been passed.  Lochia lessening.  Feeding method: Formula feed.  Birth control plan: Abstinence/barrier    Meds Reviewed  ROS reviewed. All other ROS other than mentioned above are reported as negative.  Surgical history updated.  Prenatal Labs:    Lab Results   Component Value Date    RUBELLAABIGG Immune 2020    ABO B 2020    RH Positive 2020         Objective:  Vitals:    11/15/20 0219 11/15/20 0407 11/15/20 0504 11/15/20 0603   BP:  132/71 120/74 124/62   BP Location:   Left arm Left arm   Patient Position:   Lying Lying   Pulse: 97 78 77 95   Resp:   18 18   Temp:   98.6 °F (37 °C)    TempSrc:   Axillary    SpO2: 98%  97%    Weight:       Height:          General: alert, well appearing, in no apparent distress  CVS: RRR, S1/S2, no murmur  Lungs: clear to auscultation, no wheezes or rales and unlabored breathing  Bowel Sounds: hypoactive  Uterine Fundus: firm  Incision: no significant drainage, no dehiscence, no significant erythema  Dressing: binder dry    Labs:  Lab Results   Component Value Date    WBC 9.77 2020    HGB 10.5 (L) 2020    HCT 31.0 (L) 2020    MCV 76.7 (L) 2020     2020    RH Positive 2020    HEPBSAG Negative 2020     ?  Assessment:   Active Hospital Problems    Diagnosis  POA   • Status post primary low transverse  section [Z98.891]  Not Applicable   • Gestational hypertension [O13.9]  Yes      Resolved Hospital Problems   No resolved problems to display.     Blood pressures have been generally stable at 120s-130s/60s-70's post . Urine output lessening, but it is still adequate.  No flatus yet.    Plan:  30 y.o.  36w1d with Estimated Date of Delivery: 20 s/p , Low Transverse  for delivery of viable Male  POD 1.     -Encourage ambulation  -CTM urine output  -CTM Bps  -Continue current care plan    Signature    This document has been electronically signed by Ye Zarco MD on November 15, 2020 07:08 CST

## 2020-11-15 NOTE — L&D DELIVERY NOTE
Patient underwent primary low transverse  section for nonreassuring fetal status.  Please see op report for full details.

## 2020-11-15 NOTE — ANESTHESIA POSTPROCEDURE EVALUATION
Patient: Jackelyn Lopez    Procedure Summary     Date: 20 Room / Location: [facility] LABOR DELIVERY  [facility] LABOR DELIVERY    Anesthesia Start: 1308 Anesthesia Stop: 11/15/20 0005    Procedure:  SECTION PRIMARY (N/A Abdomen) Diagnosis:     Surgeon: Nikolai Mtz DO Provider: Graham Christianson CRNA    Anesthesia Type: epidural ASA Status: 3          Anesthesia Type: epidural    Vitals  Vitals Value Taken Time   /63 20   Temp 98.2 °F (36.8 °C) 20   Pulse 129 20   Resp 20 20   SpO2 99 % 20           Post Anesthesia Care and Evaluation    Patient location during evaluation: bedside  Patient participation: complete - patient participated  Level of consciousness: awake and alert  Pain management: adequate  Airway patency: patent  Anesthetic complications: No anesthetic complications    Cardiovascular status: acceptable  Respiratory status: acceptable  Hydration status: acceptable    Comments: --------------------            20     --------------------   BP:       134/63     Pulse:   (!) 129     Resp:                Temp:                SpO2:               --------------------

## 2020-11-15 NOTE — PLAN OF CARE
Problem: Adult Inpatient Plan of Care  Goal: Plan of Care Review  Outcome: Ongoing, Progressing  Flowsheets (Taken 11/15/2020 0626)  Progress: improving  Plan of Care Reviewed With: patient  Outcome Summary: vss, mag infusing 2 g/hr, cerda in place output decreased, ff @umb, lochia small, LTV dermabond, pain controlled with duramorph on board, tylenol and toradol scheduled   Goal Outcome Evaluation:  Plan of Care Reviewed With: patient  Progress: improving  Outcome Summary: vss, mag infusing 2 g/hr, cerda in place output decreased, ff @umb, lochia small, LTV dermabond, pain controlled with duramorph on board, tylenol and toradol scheduled

## 2020-11-15 NOTE — PROGRESS NOTES
Fetus continues to have deep variable decelerations down into the 60s despite repositioning and Pitocin being off.  Given nonreassuring fetal status will move forward with primary  section at this time.    The risks, benefits. and alternatives to  section were discussed.  The risks that were discussed included, but were not limited to, pain, infection, bleeding, hemorrhage,  injury to the bowel, bladder, uterus, tubes, ovaries, or baby which could require further surgery or prolonged hospitalization.  Remote possibility of hysterectomy and/or salpingo-oophorectomy. Remote possibility of death of baby and/or mother. I discussed the risk of bleeding with the patient and possible risk of blood transfusion.  Blood products carry risk of HIV, infection, hepatitis, and transfusion reaction. Patient is willing to accept blood products. The patient understands that SCD's will be placed on her legs to try and reduce the risk of clot formation in her legs.  She understands that we will have early ambulation to also reduce the risk of blood clot formation and to reduce the risk of pneumonia.  She will be given antibiotics prior to her surgery to help decrease the risk for infection.  All questions were answered. Patient express understanding and desires to proceed with surgery.

## 2020-11-15 NOTE — PLAN OF CARE
Goal Outcome Evaluation:  Plan of Care Reviewed With: patient  Progress: improving  Outcome Summary: vss. ff @ umbilicus. light bleeding. cerda remains in place with adequate output. mag infusing @ 2g/hr. lr infusing @ 75mL/hr. pain controlled with scheduled tylenol & toradol, roxicodone as needed. resting well. ambulated to wheelchair for trip to NICU x 1 this shift.

## 2020-11-16 PROCEDURE — 99231 SBSQ HOSP IP/OBS SF/LOW 25: CPT | Performed by: OBSTETRICS & GYNECOLOGY

## 2020-11-16 RX ADMIN — IBUPROFEN 800 MG: 800 TABLET, FILM COATED ORAL at 06:01

## 2020-11-16 RX ADMIN — ACETAMINOPHEN 1000 MG: 500 TABLET ORAL at 19:55

## 2020-11-16 RX ADMIN — PRENATAL VIT W/ FE FUMARATE-FA TAB 27-0.8 MG 1 TABLET: 27-0.8 TAB at 09:54

## 2020-11-16 RX ADMIN — ACETAMINOPHEN 1000 MG: 500 TABLET ORAL at 03:44

## 2020-11-16 RX ADMIN — OXYCODONE 5 MG: 5 TABLET ORAL at 12:30

## 2020-11-16 RX ADMIN — IBUPROFEN 800 MG: 800 TABLET, FILM COATED ORAL at 21:54

## 2020-11-16 RX ADMIN — IBUPROFEN 800 MG: 800 TABLET, FILM COATED ORAL at 14:40

## 2020-11-16 RX ADMIN — OXYCODONE 5 MG: 5 TABLET ORAL at 16:30

## 2020-11-16 RX ADMIN — OXYCODONE 5 MG: 5 TABLET ORAL at 23:25

## 2020-11-16 RX ADMIN — ACETAMINOPHEN 1000 MG: 500 TABLET ORAL at 10:02

## 2020-11-16 NOTE — PLAN OF CARE
Problem: Adult Inpatient Plan of Care  Goal: Plan of Care Review  Outcome: Ongoing, Progressing  Flowsheets (Taken 11/16/2020 1612)  Progress: improving  Plan of Care Reviewed With: patient  Outcome Summary: VSS, pain controlled with PO pain medication, vaginal bleeding light, voids and ambulates   Goal Outcome Evaluation:  Plan of Care Reviewed With: patient  Progress: improving  Outcome Summary: VSS, pain controlled with PO pain medication, vaginal bleeding light, voids and ambulates

## 2020-11-16 NOTE — PROGRESS NOTES
[facility]  Jackelyn Lopez  : 1990  MRN: [id number]  CSN: [id number]    Postpartum Day #2  Subjective   Patient is status post primary low transverse  section on postoperative day #2 for nonreassuring fetal status.  Initially patient was undergoing induction for preeclampsia with severe features.  She is ambulating without difficulty, tolerating a regular diet.  Urinating without difficulty.  Denies any headaches at this time.     Objective     Min/max vitals past 24 hours:   Temp  Min: 97.8 °F (36.6 °C)  Max: 98.7 °F (37.1 °C)  BP  Min: 103/53  Max: 131/62  Pulse  Min: 71  Max: 94  Pulse  Min: 71  Max: 94        Abdomen: soft, nontender, bowel sounds normal, no masses   fundus firm and nontender, incision is clean dry and intact no erythema or evidence of infection at this time.   Calves: Nontender, no cords palpable   Pelvic: deferred     Lab Results   Component Value Date    WBC 11.71 (H) 11/15/2020    HGB 8.8 (L) 11/15/2020    HCT 25.5 (L) 11/15/2020    MCV 75.4 (L) 11/15/2020     11/15/2020    RH Positive 2020    HEPBSAG Negative 2020        Assessment   1. Postpartum Day #2 S/P primary low transverse  section, doing well and recovering appropriately.  Appropriate drop in H&H given blood loss.  Blood pressures are normal.  No fevers.     Plan   1. Continue routine postpartum care  2. Continue routine postoperative care  3. Encourage ambulation  4. Close eye to blood pressure  5. Continue inpatient management  6. Regular diet.          This document has been electronically signed by Nikolai Mtz DO on 2020 06:03 CST

## 2020-11-17 PROBLEM — O11.9 CHRONIC HYPERTENSION WITH SUPERIMPOSED PRE-ECLAMPSIA: Status: ACTIVE | Noted: 2020-11-13

## 2020-11-17 PROBLEM — O14.13 SEVERE PRE-ECLAMPSIA IN THIRD TRIMESTER: Status: ACTIVE | Noted: 2020-11-13

## 2020-11-17 PROCEDURE — 99231 SBSQ HOSP IP/OBS SF/LOW 25: CPT | Performed by: OBSTETRICS & GYNECOLOGY

## 2020-11-17 PROCEDURE — 94799 UNLISTED PULMONARY SVC/PX: CPT

## 2020-11-17 RX ORDER — DOCUSATE SODIUM 100 MG/1
100 CAPSULE, LIQUID FILLED ORAL 2 TIMES DAILY
Status: DISCONTINUED | OUTPATIENT
Start: 2020-11-17 | End: 2020-11-18 | Stop reason: HOSPADM

## 2020-11-17 RX ORDER — POLYETHYLENE GLYCOL 3350 17 G/17G
17 POWDER, FOR SOLUTION ORAL 2 TIMES DAILY
Status: DISCONTINUED | OUTPATIENT
Start: 2020-11-17 | End: 2020-11-18 | Stop reason: HOSPADM

## 2020-11-17 RX ADMIN — ACETAMINOPHEN 1000 MG: 500 TABLET ORAL at 18:28

## 2020-11-17 RX ADMIN — OXYCODONE 5 MG: 5 TABLET ORAL at 09:26

## 2020-11-17 RX ADMIN — POLYETHYLENE GLYCOL 3350 17 G: 17 POWDER, FOR SOLUTION ORAL at 08:48

## 2020-11-17 RX ADMIN — OXYCODONE 5 MG: 5 TABLET ORAL at 14:47

## 2020-11-17 RX ADMIN — DOCUSATE SODIUM 100 MG: 100 CAPSULE, LIQUID FILLED ORAL at 08:48

## 2020-11-17 RX ADMIN — ACETAMINOPHEN 1000 MG: 500 TABLET ORAL at 11:03

## 2020-11-17 RX ADMIN — DOCUSATE SODIUM 100 MG: 100 CAPSULE, LIQUID FILLED ORAL at 21:49

## 2020-11-17 RX ADMIN — IBUPROFEN 800 MG: 800 TABLET, FILM COATED ORAL at 06:02

## 2020-11-17 RX ADMIN — IBUPROFEN 800 MG: 800 TABLET, FILM COATED ORAL at 21:49

## 2020-11-17 RX ADMIN — POLYETHYLENE GLYCOL 3350 17 G: 17 POWDER, FOR SOLUTION ORAL at 21:49

## 2020-11-17 RX ADMIN — PRENATAL VIT W/ FE FUMARATE-FA TAB 27-0.8 MG 1 TABLET: 27-0.8 TAB at 08:48

## 2020-11-17 RX ADMIN — IBUPROFEN 800 MG: 800 TABLET, FILM COATED ORAL at 14:47

## 2020-11-17 RX ADMIN — ACETAMINOPHEN 1000 MG: 500 TABLET ORAL at 04:04

## 2020-11-17 RX ADMIN — NIFEDIPINE 60 MG: 60 TABLET, EXTENDED RELEASE ORAL at 11:02

## 2020-11-17 NOTE — PLAN OF CARE
Problem: Adult Inpatient Plan of Care  Goal: Plan of Care Review  Outcome: Ongoing, Progressing  Flowsheets  Taken 11/17/2020 0416 by Melissa Angulo, RN  Progress: improving  Outcome Summary: vss, ambulating and voiding well, pain well controlled, funuds firm with bleed light, passing flatus  Taken 11/16/2020 1612 by Silvia Childress, RN  Plan of Care Reviewed With: patient   Goal Outcome Evaluation:  Plan of Care Reviewed With: patient  Progress: improving  Outcome Summary: vss, ambulating and voiding well, pain well controlled, funuds firm with bleed light, passing flatus

## 2020-11-17 NOTE — PROGRESS NOTES
[facility]  Jackelyn Lopez  : 1990  MRN: [id number]  CSN: [id number]    Postpartum Day #3  Subjective   Patient is status post primary low transverse  section on postoperative day 3 for non-reassuring fetal distress. She is feeling well today. She is ambulating without difficulty and tolerating a regular diet. She denies headaches, dizziness, fevers/chills and nasuea/vomiting. She is urinating without difficulty. Lochia is light and diminishing. She has passed flatus but has not had a bowel movement.       Objective     Min/max vitals past 24 hours:   Temp  Min: 97.8 °F (36.6 °C)  Max: 98.5 °F (36.9 °C)  BP  Min: 110/55  Max: 131/60  Pulse  Min: 72  Max: 91  Pulse  Min: 72  Max: 91        Abdomen: soft, nontender, bowel sounds normal, no masses   fundus firm and nontender   Calves: Nontender, no cords palpable   Pelvic: deferred     Lab Results   Component Value Date    WBC 11.71 (H) 11/15/2020    HGB 8.8 (L) 11/15/2020    HCT 25.5 (L) 11/15/2020    MCV 75.4 (L) 11/15/2020     11/15/2020    RH Positive 2020    HEPBSAG Negative 2020        Assessment   1. Postpartum Day #3 S/P primary low transverse  section. Patient is doing well and recovering appropriately. No s/s of infection. Blood pressures are within normal limits.      Plan   1. Continue routine postpartum care.   2. Continue routine postoperative care.   3. Continue to monitor blood pressures.   4. Encourage ambulation.         This document has been electronically signed by Jayce Young, Medical Student on 2020 06:08 CST      I have seen and evaluated the patient.  I have discussed the case with the medical student. I have reviewed the notes, assessment and plan, and/or procedures performed by the medical student. I concur with the medical student’s documentation.        This document has been electronically signed by Nikolai Mtz DO on 2020 07:22 CST

## 2020-11-18 VITALS
DIASTOLIC BLOOD PRESSURE: 58 MMHG | RESPIRATION RATE: 18 BRPM | BODY MASS INDEX: 49.5 KG/M2 | HEART RATE: 88 BPM | WEIGHT: 269 LBS | OXYGEN SATURATION: 99 % | TEMPERATURE: 98.7 F | SYSTOLIC BLOOD PRESSURE: 116 MMHG | HEIGHT: 62 IN

## 2020-11-18 DIAGNOSIS — O16.3 MATERNAL HYPERTENSION IN THIRD TRIMESTER: ICD-10-CM

## 2020-11-18 DIAGNOSIS — O09.43 SUPERVISION OF HIGH-RISK PREGNANCY WITH GRAND MULTIPARITY IN THIRD TRIMESTER: ICD-10-CM

## 2020-11-18 PROCEDURE — 99238 HOSP IP/OBS DSCHRG MGMT 30/<: CPT | Performed by: OBSTETRICS & GYNECOLOGY

## 2020-11-18 RX ORDER — OXYCODONE AND ACETAMINOPHEN 7.5; 325 MG/1; MG/1
1 TABLET ORAL EVERY 6 HOURS PRN
Qty: 20 TABLET | Refills: 0 | Status: SHIPPED | OUTPATIENT
Start: 2020-11-18 | End: 2021-01-07

## 2020-11-18 RX ORDER — POLYETHYLENE GLYCOL 3350 17 G/17G
17 POWDER, FOR SOLUTION ORAL 2 TIMES DAILY
Qty: 72 PACKET | Refills: 2 | Status: SHIPPED | OUTPATIENT
Start: 2020-11-18 | End: 2021-08-13

## 2020-11-18 RX ORDER — IBUPROFEN 800 MG/1
800 TABLET ORAL EVERY 8 HOURS SCHEDULED
Qty: 60 TABLET | Refills: 2 | Status: SHIPPED | OUTPATIENT
Start: 2020-11-18 | End: 2021-09-30 | Stop reason: SDUPTHER

## 2020-11-18 RX ADMIN — IBUPROFEN 800 MG: 800 TABLET, FILM COATED ORAL at 14:14

## 2020-11-18 RX ADMIN — IBUPROFEN 800 MG: 800 TABLET, FILM COATED ORAL at 05:34

## 2020-11-18 RX ADMIN — ACETAMINOPHEN 1000 MG: 500 TABLET ORAL at 10:00

## 2020-11-18 RX ADMIN — ACETAMINOPHEN 1000 MG: 500 TABLET ORAL at 02:27

## 2020-11-18 RX ADMIN — POLYETHYLENE GLYCOL 3350 17 G: 17 POWDER, FOR SOLUTION ORAL at 09:55

## 2020-11-18 RX ADMIN — DOCUSATE SODIUM 100 MG: 100 CAPSULE, LIQUID FILLED ORAL at 09:54

## 2020-11-18 RX ADMIN — PRENATAL VIT W/ FE FUMARATE-FA TAB 27-0.8 MG 1 TABLET: 27-0.8 TAB at 09:54

## 2020-11-18 RX ADMIN — NIFEDIPINE 60 MG: 60 TABLET, EXTENDED RELEASE ORAL at 09:55

## 2020-11-18 NOTE — PROGRESS NOTES
[facility]  Jackelyn Lopez  : 1990  MRN: [id number]  CSN: [id number]    Post-operative Day #4  Subjective   Her pain is well controlled.  Vaginal bleeding is appropriate amount.  She is passing gas and has had a bowel movement. Patient is ambulating appropriately. She has no acute complaints at this time.      Objective     Min/max vitals past 24 hours:   Temp  Min: 97.7 °F (36.5 °C)  Max: 98.7 °F (37.1 °C)  BP  Min: 110/55  Max: 127/57  Pulse  Min: 81  Max: 101  Pulse  Min: 81  Max: 101        General: well developed; well nourished  no acute distress   Abdomen: soft, non-tender; no masses  no umbilical or inguinal hernias are present  no hepato-splenomegaly  fundus firm and non-tender    Pelvic: Not performed   Ext:  Heart:  Calves NT. No cords palpated.   Regular rate and rhythm. No murmurs or gallops auscultated.      Lab Results   Component Value Date    WBC 11.71 (H) 11/15/2020    HGB 8.8 (L) 11/15/2020    HCT 25.5 (L) 11/15/2020    MCV 75.4 (L) 11/15/2020     11/15/2020    RH Positive 2020    HEPBSAG Negative 2020        Assessment   1. Patient is doing well and is recovering appropriately. Blood pressures well controlled. Patient has had bowel movement.      Plan   1. Continue routine postpartum care   2. Encourage ambulation     Discharge home today.       This document has been electronically signed by Jayce Young, Medical Student on 2020 06:11 CST      I have seen and evaluated the patient.  I have discussed the case with the medical student. I have reviewed the notes, assessment and plan, and/or procedures performed by the medical student. I concur with the medical student’s documentation.        This document has been electronically signed by Nikolai Mtz DO on 2020 06:54 CST

## 2020-11-18 NOTE — DISCHARGE INSTRUCTIONS
Report temperature of 100.4 or greater, vaginal bleeding saturating a pad an hour or more, passing clots.  Report pain uncontrolled with pain medications.  Seek attention for labored or difficult breathing, new headache, visual changes, leg cramps, lumps or red streaking in breasts. Call with any questions or concerns.  Keep all scheduled postpartum appointments.

## 2020-11-18 NOTE — DISCHARGE SUMMARY
[facility]  Jackelyn Lopez  : 1990  MRN: [id number]  CSN: [id number]    Discharge Summary    Date of Admission: 2020  Date of Discharge: 2020    Principle Discharge Dx:  1.  Repeat  section, preeclampsia with severe features.    Procedures Performed:  Procedure(s):   SECTION PRIMARY    Brief History:  Patient is a 30 y.o. now  who presented to labor and delivery with elevated blood pressures and severe headache was diagnosed with preeclampsia with severe features.  Underwent induction of labor however due to fetal heart rate decelerations underwent primary low transverse  section.  Patient had uncomplicated surgery and uncomplicated postoperative course..    Hospital Course:  Her post-operative course was unremarkable.  On POD # 4 she expressed the desire for discharge. She had no febrile morbidity. She had passed gas, and was urinating normally. She was eating a regular diet without difficulty. She was ambulating well. Her incision was clean, dry and intact. Discharge instructions were given.  All questions were answered.    Condition:  Stable  Discharge Activity:    Activity Instructions     Bathing Restrictions      Type of Restriction: Bathing    Bathing Restrictions: Other    Explain Bathing Restrictions: No soaking in bathtub for 4 weeks, showers are fine.    Driving Restrictions      Type of Restriction: Driving    Driving Restrictions: No Driving (Time Limited)    Length: Other    Indicate Length of Restriction: No driving for 1 week or while on narcotic pain medications. Riding is car is fine.    Lifting Restrictions      Type of Restriction: Lifting    Lifting Restrictions: Other    Explain Lifing Restrictions: No lifting more than infant and baby carrier together for 6 weeks.    Pelvic Rest      Nothing in the vagina for 6 weeks to include tampons or intercourse.    Sexual Activity Restrictions      Type of Restriction: Sex    Explain  Sexual Activity Restrictions: No sexual intercourse for at least 6 weeks        Discharge Diet:  Regular  Discharge Medications:       Your medication list      START taking these medications      Instructions Last Dose Given Next Dose Due   ibuprofen 800 MG tablet  Commonly known as: ADVIL,MOTRIN      Take 1 tablet by mouth Every 8 (Eight) Hours.       oxyCODONE-acetaminophen 7.5-325 MG per tablet  Commonly known as: Percocet      Take 1 tablet by mouth Every 6 (Six) Hours As Needed for Moderate Pain .       polyethylene glycol 17 g packet  Commonly known as: MIRALAX      Take 17 g by mouth 2 (Two) Times a Day.          CONTINUE taking these medications      Instructions Last Dose Given Next Dose Due   NIFEdipine CC 60 MG 24 hr tablet  Commonly known as: Procardia XL      Take 1 tablet by mouth Daily.       omeprazole 40 MG capsule  Commonly known as: PrilOSEC      Take 1 capsule by mouth Daily.       Prenatal Vitamin and Mineral 28-0.8 MG tablet      Prenatal Vitamin tablet   Take 1 tablet every day by oral route for 90 days.             Where to Get Your Medications      These medications were sent to [facility] [place] [id number]    Hours: Monday through Friday 7:00am to 5:00pm Phone: [phone]   · ibuprofen 800 MG tablet  · oxyCODONE-acetaminophen 7.5-325 MG per tablet  · polyethylene glycol 17 g packet       Discharge Disposition:  Home  Follow-Up: Follow-up with Dr. Mtz in 1 week in clinic for dressing removal.      This document has been electronically signed by Nikolai Mtz DO on November 18, 2020 06:56 CST

## 2020-11-19 LAB
LAB AP CASE REPORT: NORMAL
PATH REPORT.FINAL DX SPEC: NORMAL

## 2020-11-19 NOTE — PAYOR COMM NOTE
"Crystal Aburto  [facility]  P: [phone]  F: [phone]    auth#[id number]    Brian Cochran (30 y.o. Female)     Date of Birth Social Security Number Address Home Phone MRN    1990  [address]  [facility] [address] [phone] [id number]    [employer] Marital Status          None Single       Admission Date Admission Type Admitting Provider Attending Provider Department, Room/Bed    20 Elective Nikolai Mtz DO  [facility] MOTHER BABY, M754/1    Discharge Date Discharge Disposition Discharge Destination        2020 Home or Self Care              Attending Provider: (none)   Allergies: Azithromycin    Isolation: None   Infection: None   Code Status: Prior    Ht: 157.5 cm (62\")   Wt: 122 kg (269 lb)    Admission Cmt: None   Principal Problem: Chronic hypertension with superimposed pre-eclampsia [O11.9]                 Active Insurance as of 2020     Primary Coverage     Payor Plan Insurance Group Employer/Plan Group    ANTH MEDICAID [facility] MEDICAID [id number]     Payor Plan Address Payor Plan Phone Number Payor Plan Fax Number Effective Dates    [address] [address] [phone]  2020 - None Entered    [facility] [id number]       Subscriber Name Subscriber Birth Date Member ID       BRIAN COCHRAN 1990 [id number]                 Emergency Contacts      (Rel.) Home Phone Work Phone Mobile Phone    nasim burrows (Significant Other) [phone] -- [phone]               Discharge Summary      Nikolai Mtz DO at 20 0656          [facility]  Brian Cochran  : 1990  MRN: [id number]  CSN: [id number]    Discharge Summary    Date of Admission: 2020  Date of Discharge: 2020    Principle Discharge Dx:  1.  Repeat  section, preeclampsia with severe features.    Procedures Performed:  Procedure(s):   SECTION " PRIMARY    Brief History:  Patient is a 30 y.o. now  who presented to labor and delivery with elevated blood pressures and severe headache was diagnosed with preeclampsia with severe features.  Underwent induction of labor however due to fetal heart rate decelerations underwent primary low transverse  section.  Patient had uncomplicated surgery and uncomplicated postoperative course..    Hospital Course:  Her post-operative course was unremarkable.  On POD # 4 she expressed the desire for discharge. She had no febrile morbidity. She had passed gas, and was urinating normally. She was eating a regular diet without difficulty. She was ambulating well. Her incision was clean, dry and intact. Discharge instructions were given.  All questions were answered.    Condition:  Stable  Discharge Activity:    Activity Instructions     Bathing Restrictions      Type of Restriction: Bathing    Bathing Restrictions: Other    Explain Bathing Restrictions: No soaking in bathtub for 4 weeks, showers are fine.    Driving Restrictions      Type of Restriction: Driving    Driving Restrictions: No Driving (Time Limited)    Length: Other    Indicate Length of Restriction: No driving for 1 week or while on narcotic pain medications. Riding is car is fine.    Lifting Restrictions      Type of Restriction: Lifting    Lifting Restrictions: Other    Explain Lifing Restrictions: No lifting more than infant and baby carrier together for 6 weeks.    Pelvic Rest      Nothing in the vagina for 6 weeks to include tampons or intercourse.    Sexual Activity Restrictions      Type of Restriction: Sex    Explain Sexual Activity Restrictions: No sexual intercourse for at least 6 weeks        Discharge Diet:  Regular  Discharge Medications:       Your medication list      START taking these medications      Instructions Last Dose Given Next Dose Due   ibuprofen 800 MG tablet  Commonly known as: ADVIL,MOTRIN      Take 1 tablet by mouth Every  8 (Eight) Hours.       oxyCODONE-acetaminophen 7.5-325 MG per tablet  Commonly known as: Percocet      Take 1 tablet by mouth Every 6 (Six) Hours As Needed for Moderate Pain .       polyethylene glycol 17 g packet  Commonly known as: MIRALAX      Take 17 g by mouth 2 (Two) Times a Day.          CONTINUE taking these medications      Instructions Last Dose Given Next Dose Due   NIFEdipine CC 60 MG 24 hr tablet  Commonly known as: Procardia XL      Take 1 tablet by mouth Daily.       omeprazole 40 MG capsule  Commonly known as: PrilOSEC      Take 1 capsule by mouth Daily.       Prenatal Vitamin and Mineral 28-0.8 MG tablet      Prenatal Vitamin tablet   Take 1 tablet every day by oral route for 90 days.             Where to Get Your Medications      These medications were sent to [facility] [place] [id number]    Hours: Monday through Friday 7:00am to 5:00pm Phone: [phone]   · ibuprofen 800 MG tablet  · oxyCODONE-acetaminophen 7.5-325 MG per tablet  · polyethylene glycol 17 g packet       Discharge Disposition:  Home  Follow-Up: Follow-up with Dr. Collins in 1 week in clinic for dressing removal.      This document has been electronically signed by Lucius Collins DO on November 18, 2020 06:56 CST            Electronically signed by Lucius Collins DO at 11/18/20 0657       Discharge Order (From admission, onward)     Start     Ordered    11/18/20 0655  Discharge patient  Once     Expected Discharge Date: 11/18/20    Discharge Disposition: Home or Self Care    Physician of Record for Attribution - Please select from Treatment Team: LUCIUS COLLINS [[id number]]    Review needed by CMO to determine Physician of Record: No       Question Answer Comment   Physician of Record for Attribution - Please select from Treatment Team LUCIUS COLLINS    Review needed by CMO to determine Physician of Record No        11/18/20 0656

## 2020-12-03 ENCOUNTER — POSTPARTUM VISIT (OUTPATIENT)
Dept: OBSTETRICS AND GYNECOLOGY | Facility: CLINIC | Age: 30
End: 2020-12-03

## 2020-12-03 VITALS
SYSTOLIC BLOOD PRESSURE: 122 MMHG | DIASTOLIC BLOOD PRESSURE: 86 MMHG | BODY MASS INDEX: 47.55 KG/M2 | WEIGHT: 258.4 LBS | HEIGHT: 62 IN

## 2020-12-03 DIAGNOSIS — Z09 POSTOPERATIVE FOLLOW-UP: Primary | ICD-10-CM

## 2020-12-03 DIAGNOSIS — Z98.891 STATUS POST PRIMARY LOW TRANSVERSE CESAREAN SECTION: ICD-10-CM

## 2020-12-03 PROCEDURE — 0503F POSTPARTUM CARE VISIT: CPT | Performed by: OBSTETRICS & GYNECOLOGY

## 2020-12-03 RX ORDER — FLUCONAZOLE 150 MG/1
150 TABLET ORAL DAILY
Qty: 2 TABLET | Refills: 0 | Status: SHIPPED | OUTPATIENT
Start: 2020-12-03 | End: 2021-01-07

## 2021-01-07 ENCOUNTER — POSTPARTUM VISIT (OUTPATIENT)
Dept: OBSTETRICS AND GYNECOLOGY | Facility: CLINIC | Age: 31
End: 2021-01-07

## 2021-01-07 VITALS
WEIGHT: 266 LBS | SYSTOLIC BLOOD PRESSURE: 122 MMHG | DIASTOLIC BLOOD PRESSURE: 80 MMHG | BODY MASS INDEX: 48.95 KG/M2 | HEIGHT: 62 IN

## 2021-01-07 DIAGNOSIS — Z09 POSTOPERATIVE FOLLOW-UP: ICD-10-CM

## 2021-01-07 DIAGNOSIS — O09.43 SUPERVISION OF HIGH-RISK PREGNANCY WITH GRAND MULTIPARITY IN THIRD TRIMESTER: ICD-10-CM

## 2021-01-07 DIAGNOSIS — O16.3 MATERNAL HYPERTENSION IN THIRD TRIMESTER: ICD-10-CM

## 2021-01-07 DIAGNOSIS — O99.213 OBESITY AFFECTING PREGNANCY IN THIRD TRIMESTER: Primary | ICD-10-CM

## 2021-01-07 DIAGNOSIS — O09.299 HX OF PREECLAMPSIA, PRIOR PREGNANCY, CURRENTLY PREGNANT: ICD-10-CM

## 2021-01-07 DIAGNOSIS — Z98.891 STATUS POST PRIMARY LOW TRANSVERSE CESAREAN SECTION: ICD-10-CM

## 2021-01-07 DIAGNOSIS — O11.9 CHRONIC HYPERTENSION WITH SUPERIMPOSED PRE-ECLAMPSIA: ICD-10-CM

## 2021-01-07 PROBLEM — O99.333 TOBACCO SMOKING AFFECTING PREGNANCY IN THIRD TRIMESTER: Status: RESOLVED | Noted: 2020-10-05 | Resolved: 2021-01-07

## 2021-01-07 PROBLEM — F12.11 MARIJUANA ABUSE IN REMISSION: Status: RESOLVED | Noted: 2020-10-05 | Resolved: 2021-01-07

## 2021-01-07 PROBLEM — O99.323 DRUG USE AFFECTING PREGNANCY IN THIRD TRIMESTER: Status: RESOLVED | Noted: 2020-10-05 | Resolved: 2021-01-07

## 2021-01-07 PROCEDURE — 99213 OFFICE O/P EST LOW 20 MIN: CPT | Performed by: OBSTETRICS & GYNECOLOGY

## 2021-01-07 RX ORDER — ACETAMINOPHEN AND CODEINE PHOSPHATE 120; 12 MG/5ML; MG/5ML
1 SOLUTION ORAL DAILY
Qty: 28 TABLET | Refills: 12 | Status: SHIPPED | OUTPATIENT
Start: 2021-01-07 | End: 2021-11-15

## 2021-01-07 NOTE — PROGRESS NOTES
"Chief complaint: Postpartum visit    SUBJECTIVE:   Pt is a 30 y.o.  now s/p primary low transverse  section approximately 6 weeks ago.. Pt denies fever, abdominal pain, n/v/d/c, VB, Pt currently bottle feeding and taking PNV, ambulating without difficulty, urinating and stooling without complaints and tolerating normal diet. EPDS 0/30 mostly anxiety, no SI or HI currently. Desires OCP  for contraception.  OBJECTIVE:  /80   Ht 157.5 cm (62\")   Wt 121 kg (266 lb)   LMP 2020 (Approximate)   BMI 48.65 kg/m²     Review of Systems   Constitutional: Negative for chills, fatigue and fever.   HENT: Negative for sore throat.    Eyes: Negative for visual disturbance.   Respiratory: Negative for cough, shortness of breath and wheezing.    Cardiovascular: Negative for chest pain, palpitations and leg swelling.   Gastrointestinal: Negative for abdominal pain, diarrhea, nausea and vomiting.   Genitourinary: Negative for dysuria, flank pain, frequency, vaginal bleeding, vaginal discharge and vaginal pain.   Neurological: Negative for syncope, light-headedness and headaches.   Psychiatric/Behavioral: Negative for dysphoric mood and suicidal ideas. The patient is not nervous/anxious.        Physical Exam  Vitals signs reviewed.   Constitutional:       General: She is not in acute distress.     Appearance: Normal appearance. She is obese. She is not ill-appearing, toxic-appearing or diaphoretic.   HENT:      Head: Normocephalic.   Abdominal:      General: There is no distension.      Palpations: Abdomen is soft.      Tenderness: There is no abdominal tenderness. There is no guarding.      Comments: Appropriately healing Pfannenstiel incision.   Musculoskeletal: Normal range of motion.   Skin:     General: Skin is warm and dry.   Neurological:      General: No focal deficit present.      Mental Status: She is alert and oriented to person, place, and time.   Psychiatric:         Mood and Affect: Mood " normal.         Behavior: Behavior normal.         Thought Content: Thought content normal.         Judgment: Judgment normal.         ASSESSMENT:    Pt is a 30 y.o.  s/p primary low transverse  section doing well and recovering appropriately at this time.  PLAN:   1. OCP for contraception, patient does smoke we will put her on Micronor given smoking  2. Pt to continue to increase exercise/daily activities as tolerated   3. Continue prenatal vitamins   4. f/u prn, will also have patient follow-up in 6 months for Pap smear.    Med reconciliation completed.        This document has been electronically signed by Nikolai Mtz DO on 2021 15:15 CST

## 2021-02-23 ENCOUNTER — TELEPHONE (OUTPATIENT)
Dept: OBSTETRICS AND GYNECOLOGY | Facility: CLINIC | Age: 31
End: 2021-02-23

## 2021-02-23 NOTE — TELEPHONE ENCOUNTER
Spoke with patient regarding problems with bleeding on birth control. Patient had been on birth control pills for a month now. I made patient aware dr bobo likes them to give it 3 months to see if it is going to work for them. Told the patient we could switch her if she was interested, however switching her now may not fix her issues because a different birth control may cause her irregular bleeding as well. Patient is going to give it a little more time to see if things change.

## 2021-02-23 NOTE — TELEPHONE ENCOUNTER
PATIENT IS WANTING TO CHANGE HER BC..SHE IS HAVING A LOT OF BLEEDING..SHE HAS BEEN BLEEDING FOR 12 DAYS NOW   PATIENT USES [facility]

## 2021-03-26 ENCOUNTER — OFFICE VISIT (OUTPATIENT)
Dept: FAMILY MEDICINE CLINIC | Facility: CLINIC | Age: 31
End: 2021-03-26

## 2021-03-26 VITALS
HEART RATE: 93 BPM | WEIGHT: 274 LBS | HEIGHT: 62 IN | BODY MASS INDEX: 50.42 KG/M2 | OXYGEN SATURATION: 98 % | RESPIRATION RATE: 18 BRPM | DIASTOLIC BLOOD PRESSURE: 80 MMHG | SYSTOLIC BLOOD PRESSURE: 142 MMHG

## 2021-03-26 DIAGNOSIS — R03.0 ELEVATED BP WITHOUT DIAGNOSIS OF HYPERTENSION: ICD-10-CM

## 2021-03-26 DIAGNOSIS — E66.01 MORBIDLY OBESE (HCC): ICD-10-CM

## 2021-03-26 DIAGNOSIS — Z71.6 ENCOUNTER FOR SMOKING CESSATION COUNSELING: Primary | ICD-10-CM

## 2021-03-26 PROCEDURE — 99203 OFFICE O/P NEW LOW 30 MIN: CPT | Performed by: STUDENT IN AN ORGANIZED HEALTH CARE EDUCATION/TRAINING PROGRAM

## 2021-03-26 RX ORDER — BUPROPION HYDROCHLORIDE 150 MG/1
TABLET, EXTENDED RELEASE ORAL
Qty: 60 TABLET | Refills: 1 | Status: SHIPPED | OUTPATIENT
Start: 2021-03-26 | End: 2021-09-30

## 2021-03-26 NOTE — PROGRESS NOTES
"   Subjective:  Jackelyn Lopez is a 30 y.o. female who presents for [facility]    States has had 9 pregnancies, 7 children, last one was in November and complicated by emergency , uterine tear. Did not need transfusions. Pt strongly advised against future pregnancies and was started on Micornor. Since then no acute complaints, no dysuria, incontinence, gestational diabetes. Currently not breast feeding.    Smoking; Smokes 1ppd since 14.  Did not quit with prior pregnancies. Has not tried medications to quit or NRT. States when she is stressed will chain smoke.     Obesity; ready to start \"transform your fitness\". Currently working on diet and exercise.  Is enrolling in a weight loss program at 6 weeks, has diet recommendations and exercise recommendations.     Patient Active Problem List   Diagnosis   • Morbidly obese (CMS/Formerly [facility])     Vitals:    Vitals:    21 1345   BP: 142/80   Pulse: 93   Resp: 18   SpO2: 98%   Weight: 124 kg (274 lb)   Height: 157.5 cm (62\")     Body mass index is 50.12 kg/m².    Current Outpatient Medications:   •  ibuprofen (ADVIL,MOTRIN) 800 MG tablet, Take 1 tablet by mouth Every 8 (Eight) Hours **Take with food**, Disp: 60 tablet, Rfl: 2  •  norethindrone (MICRONOR) 0.35 MG tablet, Take 1 tablet by mouth Daily., Disp: 28 tablet, Rfl: 12  •  buPROPion SR (Wellbutrin SR) 150 MG 12 hr tablet, 150 mg once daily for 3 days; increase to 150 mg twice daily as tolerated. (maximum dose: 300 mg/day), Disp: 60 tablet, Rfl: 1  •  omeprazole (PrilOSEC) 40 MG capsule, Take 1 capsule by mouth Daily., Disp: 30 capsule, Rfl: 12  •  polyethylene glycol (MIRALAX) 17 g packet, Dissolve 1 packet (17 g) in 4 to 8 ounces of liquid and drink by mouth 2 (Two) Times a Day., Disp: 72 packet, Rfl: 2  •  Prenatal Vit-Fe Fumarate-FA (Prenatal Vitamin and Mineral) 28-0.8 MG tablet, Prenatal Vitamin tablet  Take 1 tablet every day by oral route for 90 days., Disp: , Rfl:     Review of " Systems  Review of Systems   Constitutional: Negative for appetite change and fever.   HENT: Negative for sore throat.    Eyes: Negative for discharge and visual disturbance.   Respiratory: Negative for cough and shortness of breath.    Cardiovascular: Negative for chest pain, palpitations and leg swelling.   Gastrointestinal: Negative for abdominal pain, diarrhea and vomiting.   Genitourinary: Negative for dysuria.   Skin: Negative for rash.   Neurological: Negative for light-headedness and headaches.   Psychiatric/Behavioral: Negative for agitation.       Patient Active Problem List   Diagnosis   • Morbidly obese (CMS/HCC)     Past Surgical History:   Procedure Laterality Date   •  SECTION N/A 2020    Procedure:  SECTION PRIMARY;  Surgeon: Nikolai Mtz DO;  Location: [facility] LABOR DELIVERY;  Service: Obstetrics;  Laterality: N/A;   • CHOLECYSTECTOMY       Social History     Socioeconomic History   • Marital status: Single     Spouse name: Not on file   • Number of children: Not on file   • Years of education: Not on file   • Highest education level: Not on file   Tobacco Use   • Smoking status: Current Every Day Smoker     Packs/day: 0.25     Years: 15.00     Pack years: 3.75   • Smokeless tobacco: Never Used   Substance and Sexual Activity   • Alcohol use: Not Currently   • Drug use: Yes     Types: Marijuana   • Sexual activity: Yes     Partners: Male     Birth control/protection: None     Family History   Problem Relation Age of Onset   • Hypertension Mother    • Diabetes Mother    • COPD Maternal Grandmother      Admission on 2020, Discharged on 2020   Component Date Value Ref Range Status   • Color, UA 2020 Yellow  Yellow, Straw, Dark Yellow, Bruna Final   • Appearance, UA 2020 Clear  Clear Final   • pH, UA 2020 7.0  5.0 - 9.0 Final   • Specific Gravity, UA 2020 1.011  1.003 - 1.030 Final   • Glucose, UA 2020 Negative  Negative Final    • Ketones, UA 11/13/2020 Negative  Negative Final   • Bilirubin, UA 11/13/2020 Negative  Negative Final   • Blood, UA 11/13/2020 Small (1+)* Negative Final   • Protein, UA 11/13/2020 Negative  Negative Final   • Leuk Esterase, UA 11/13/2020 Negative  Negative Final   • Nitrite, UA 11/13/2020 Negative  Negative Final   • Urobilinogen, UA 11/13/2020 1.0 E.U./dL  0.2 - 1.0 E.U./dL Final   • RBC, UA 11/13/2020 3-5* None Seen /HPF Final   • WBC, UA 11/13/2020 0-2  None Seen, 0-2, 3-5 /HPF Final   • Bacteria, UA 11/13/2020 None Seen  None Seen /HPF Final   • Squamous Epithelial Cells, UA 11/13/2020 None Seen  None Seen, 0-2 /HPF Final   • Hyaline Casts, UA 11/13/2020 0-2  None Seen /LPF Final   • Methodology 11/13/2020 Automated Microscopy   Final   • Glucose 11/13/2020 92  65 - 99 mg/dL Final   • BUN 11/13/2020 5* 6 - 20 mg/dL Final   • Creatinine 11/13/2020 0.51* 0.57 - 1.00 mg/dL Final   • Sodium 11/13/2020 136  136 - 145 mmol/L Final   • Potassium 11/13/2020 3.3* 3.5 - 5.2 mmol/L Final   • Chloride 11/13/2020 101  98 - 107 mmol/L Final   • CO2 11/13/2020 21.0* 22.0 - 29.0 mmol/L Final   • Calcium 11/13/2020 9.5  8.6 - 10.5 mg/dL Final   • Total Protein 11/13/2020 7.2  6.0 - 8.5 g/dL Final   • Albumin 11/13/2020 3.80  3.50 - 5.20 g/dL Final   • ALT (SGPT) 11/13/2020 20  1 - 33 U/L Final   • AST (SGOT) 11/13/2020 22  1 - 32 U/L Final   • Alkaline Phosphatase 11/13/2020 181* 39 - 117 U/L Final   • Total Bilirubin 11/13/2020 0.3  0.0 - 1.2 mg/dL Final   • eGFR   Amer 11/13/2020 >150  >60 mL/min/1.73 Final   • Globulin 11/13/2020 3.4  gm/dL Final   • A/G Ratio 11/13/2020 1.1  g/dL Final   • BUN/Creatinine Ratio 11/13/2020 9.8  7.0 - 25.0 Final   • Anion Gap 11/13/2020 14.0  5.0 - 15.0 mmol/L Final   • Protein/Creatinine Ratio, Urine 11/13/2020 101.9  0.0 - 200.0 mg/G Crea Final   • Creatinine, Urine 11/13/2020 58.9  mg/dL Final   • Total Protein, Urine 11/13/2020 6.0  mg/dL Final   • WBC 11/13/2020 9.77  3.40 -  10.80 10*3/mm3 Final   • RBC 11/13/2020 4.04  3.77 - 5.28 10*6/mm3 Final   • Hemoglobin 11/13/2020 10.5* 12.0 - 15.9 g/dL Final   • Hematocrit 11/13/2020 31.0* 34.0 - 46.6 % Final   • MCV 11/13/2020 76.7* 79.0 - 97.0 fL Final   • MCH 11/13/2020 26.0* 26.6 - 33.0 pg Final   • MCHC 11/13/2020 33.9  31.5 - 35.7 g/dL Final   • RDW 11/13/2020 14.9  12.3 - 15.4 % Final   • RDW-SD 11/13/2020 41.1  37.0 - 54.0 fl Final   • MPV 11/13/2020 11.5  6.0 - 12.0 fL Final   • Platelets 11/13/2020 167  140 - 450 10*3/mm3 Final   • Neutrophil % 11/13/2020 67.5  42.7 - 76.0 % Final   • Lymphocyte % 11/13/2020 21.2  19.6 - 45.3 % Final   • Monocyte % 11/13/2020 10.4  5.0 - 12.0 % Final   • Eosinophil % 11/13/2020 0.4  0.3 - 6.2 % Final   • Basophil % 11/13/2020 0.2  0.0 - 1.5 % Final   • Immature Grans % 11/13/2020 0.3  0.0 - 0.5 % Final   • Neutrophils, Absolute 11/13/2020 6.59  1.70 - 7.00 10*3/mm3 Final   • Lymphocytes, Absolute 11/13/2020 2.07  0.70 - 3.10 10*3/mm3 Final   • Monocytes, Absolute 11/13/2020 1.02* 0.10 - 0.90 10*3/mm3 Final   • Eosinophils, Absolute 11/13/2020 0.04  0.00 - 0.40 10*3/mm3 Final   • Basophils, Absolute 11/13/2020 0.02  0.00 - 0.20 10*3/mm3 Final   • Immature Grans, Absolute 11/13/2020 0.03  0.00 - 0.05 10*3/mm3 Final   • nRBC 11/13/2020 0.0  0.0 - 0.2 /100 WBC Final   • ABO Type 11/14/2020 B   Final   • RH type 11/14/2020 Positive   Final   • Antibody Screen 11/14/2020 Negative   Final   • T&S Expiration Date 11/14/2020 11/17/2020 11:59:59 PM   Final   • THC, Screen, Urine 11/13/2020 Negative  Negative Final   • Phencyclidine (PCP), Urine 11/13/2020 Negative  Negative Final   • Cocaine Screen, Urine 11/13/2020 Negative  Negative Final   • Methamphetamine, Ur 11/13/2020 Negative  Negative Final   • Opiate Screen 11/13/2020 Negative  Negative Final   • Amphetamine Screen, Urine 11/13/2020 Negative  Negative Final   • Benzodiazepine Screen, Urine 11/13/2020 Negative  Negative Final   • Tricyclic  Antidepressants Screen 11/13/2020 Negative  Negative Final   • Methadone Screen, Urine 11/13/2020 Negative  Negative Final   • Barbiturates Screen, Urine 11/13/2020 Negative  Negative Final   • Oxycodone Screen, Urine 11/13/2020 Negative  Negative Final   • Propoxyphene Screen 11/13/2020 Negative  Negative Final   • Buprenorphine, Screen, Urine 11/13/2020 Negative  Negative Final   • Previous History 11/14/2020 No record on File   Final   • Case Report 11/14/2020    Final                    Value:Surgical Pathology Report                         Case: [id number]                                  Authorizing Provider:  Nikolai Mtz DO Collected:           11/14/2020 11:31 PM          Ordering Location:     [facility]             Received:            11/16/2020 06:38 AM                                 [facility]                                                                                  DELIVERY                                                                     Pathologist:           Cayetano Quintero MD                                                     Specimen:    Placenta                                                                                  • Final Diagnosis 11/14/2020    Final                    Value:This result contains rich text formatting which cannot be displayed here.   • WBC 11/15/2020 11.71* 3.40 - 10.80 10*3/mm3 Final   • RBC 11/15/2020 3.38* 3.77 - 5.28 10*6/mm3 Final   • Hemoglobin 11/15/2020 8.8* 12.0 - 15.9 g/dL Final   • Hematocrit 11/15/2020 25.5* 34.0 - 46.6 % Final   • MCV 11/15/2020 75.4* 79.0 - 97.0 fL Final   • MCH 11/15/2020 26.0* 26.6 - 33.0 pg Final   • MCHC 11/15/2020 34.5  31.5 - 35.7 g/dL Final   • RDW 11/15/2020 15.1  12.3 - 15.4 % Final   • RDW-SD 11/15/2020 41.2  37.0 - 54.0 fl Final   • MPV 11/15/2020 11.4  6.0 - 12.0 fL Final   • Platelets 11/15/2020 148  140 - 450 10*3/mm3 Final   • Neutrophil % 11/15/2020 75.8  42.7 - 76.0 % Final   •  Lymphocyte % 11/15/2020 13.7* 19.6 - 45.3 % Final   • Monocyte % 11/15/2020 9.6  5.0 - 12.0 % Final   • Eosinophil % 11/15/2020 0.4  0.3 - 6.2 % Final   • Basophil % 11/15/2020 0.2  0.0 - 1.5 % Final   • Immature Grans % 11/15/2020 0.3  0.0 - 0.5 % Final   • Neutrophils, Absolute 11/15/2020 8.89* 1.70 - 7.00 10*3/mm3 Final   • Lymphocytes, Absolute 11/15/2020 1.60  0.70 - 3.10 10*3/mm3 Final   • Monocytes, Absolute 11/15/2020 1.12* 0.10 - 0.90 10*3/mm3 Final   • Eosinophils, Absolute 11/15/2020 0.05  0.00 - 0.40 10*3/mm3 Final   • Basophils, Absolute 11/15/2020 0.02  0.00 - 0.20 10*3/mm3 Final   • Immature Grans, Absolute 11/15/2020 0.03  0.00 - 0.05 10*3/mm3 Final   • nRBC 11/15/2020 0.0  0.0 - 0.2 /100 WBC Final   Routine Prenatal on 11/11/2020   Component Date Value Ref Range Status   • Group B Strep, DNA 11/11/2020 Negative  Negative Final   Lab on 11/05/2020   Component Date Value Ref Range Status   • WBC 11/05/2020 8.07  3.40 - 10.80 10*3/mm3 Final   • RBC 11/05/2020 4.13  3.77 - 5.28 10*6/mm3 Final   • Hemoglobin 11/05/2020 10.7* 12.0 - 15.9 g/dL Final   • Hematocrit 11/05/2020 32.2* 34.0 - 46.6 % Final   • MCV 11/05/2020 78.0* 79.0 - 97.0 fL Final   • MCH 11/05/2020 25.9* 26.6 - 33.0 pg Final   • MCHC 11/05/2020 33.2  31.5 - 35.7 g/dL Final   • RDW 11/05/2020 14.9  12.3 - 15.4 % Final   • RDW-SD 11/05/2020 41.2  37.0 - 54.0 fl Final   • MPV 11/05/2020 12.2* 6.0 - 12.0 fL Final   • Platelets 11/05/2020 147  140 - 450 10*3/mm3 Final   • Glucose 11/05/2020 100* 65 - 99 mg/dL Final   • BUN 11/05/2020 6  6 - 20 mg/dL Final   • Creatinine 11/05/2020 0.50* 0.57 - 1.00 mg/dL Final   • Sodium 11/05/2020 138  136 - 145 mmol/L Final   • Potassium 11/05/2020 3.8  3.5 - 5.2 mmol/L Final   • Chloride 11/05/2020 104  98 - 107 mmol/L Final   • CO2 11/05/2020 23.1  22.0 - 29.0 mmol/L Final   • Calcium 11/05/2020 9.3  8.6 - 10.5 mg/dL Final   • Total Protein 11/05/2020 7.1  6.0 - 8.5 g/dL Final   • Albumin 11/05/2020 3.80   3.50 - 5.20 g/dL Final   • ALT (SGPT) 11/05/2020 18  1 - 33 U/L Final   • AST (SGOT) 11/05/2020 19  1 - 32 U/L Final   • Alkaline Phosphatase 11/05/2020 168* 39 - 117 U/L Final   • Total Bilirubin 11/05/2020 0.2  0.0 - 1.2 mg/dL Final   • eGFR  African Amer 11/05/2020 >150  >60 mL/min/1.73 Final   • Globulin 11/05/2020 3.3  gm/dL Final   • A/G Ratio 11/05/2020 1.2  g/dL Final   • BUN/Creatinine Ratio 11/05/2020 12.0  7.0 - 25.0 Final   • Anion Gap 11/05/2020 10.9  5.0 - 15.0 mmol/L Final   Routine Prenatal on 11/05/2020   Component Date Value Ref Range Status   • Protein/Creatinine Ratio, Urine 11/05/2020 91.9  0.0 - 200.0 mg/G Crea Final   • Creatinine, Urine 11/05/2020 141.5  mg/dL Final   • Total Protein, Urine 11/05/2020 13.0  mg/dL Final   Lab on 10/21/2020   Component Date Value Ref Range Status   • WBC 10/21/2020 8.68  3.40 - 10.80 10*3/mm3 Final   • RBC 10/21/2020 3.88  3.77 - 5.28 10*6/mm3 Final   • Hemoglobin 10/21/2020 10.2* 12.0 - 15.9 g/dL Final   • Hematocrit 10/21/2020 30.0* 34.0 - 46.6 % Final   • MCV 10/21/2020 77.3* 79.0 - 97.0 fL Final   • MCH 10/21/2020 26.3* 26.6 - 33.0 pg Final   • MCHC 10/21/2020 34.0  31.5 - 35.7 g/dL Final   • RDW 10/21/2020 14.6  12.3 - 15.4 % Final   • RDW-SD 10/21/2020 40.2  37.0 - 54.0 fl Final   • MPV 10/21/2020 11.9  6.0 - 12.0 fL Final   • Platelets 10/21/2020 149  140 - 450 10*3/mm3 Final   • Glucose 10/21/2020 113* 65 - 99 mg/dL Final   • BUN 10/21/2020 4* 6 - 20 mg/dL Final   • Creatinine 10/21/2020 0.46* 0.57 - 1.00 mg/dL Final   • Sodium 10/21/2020 137  136 - 145 mmol/L Final   • Potassium 10/21/2020 3.6  3.5 - 5.2 mmol/L Final   • Chloride 10/21/2020 106  98 - 107 mmol/L Final   • CO2 10/21/2020 21.8* 22.0 - 29.0 mmol/L Final   • Calcium 10/21/2020 8.9  8.6 - 10.5 mg/dL Final   • Total Protein 10/21/2020 6.7  6.0 - 8.5 g/dL Final   • Albumin 10/21/2020 3.50  3.50 - 5.20 g/dL Final   • ALT (SGPT) 10/21/2020 19  1 - 33 U/L Final   • AST (SGOT) 10/21/2020 24  1 -  32 U/L Final   • Alkaline Phosphatase 10/21/2020 143* 39 - 117 U/L Final   • Total Bilirubin 10/21/2020 0.2  0.0 - 1.2 mg/dL Final   • eGFR   Amer 10/21/2020 >150  >60 mL/min/1.73 Final   • Globulin 10/21/2020 3.2  gm/dL Final   • A/G Ratio 10/21/2020 1.1  g/dL Final   • BUN/Creatinine Ratio 10/21/2020 8.7  7.0 - 25.0 Final   • Anion Gap 10/21/2020 9.2  5.0 - 15.0 mmol/L Final   • Protein/Creatinine Ratio, Urine 10/21/2020 106.2  0.0 - 200.0 mg/G Crea Final   • Creatinine, Urine 10/21/2020 131.8  mg/dL Final   • Total Protein, Urine 10/21/2020 14.0  mg/dL Final   Lab on 10/07/2020   Component Date Value Ref Range Status   • Protein, 24H Urine 10/07/2020 190.0* 0.0 - 150.0 mg/24hours Final   Lab on 10/05/2020   Component Date Value Ref Range Status   • WBC 10/05/2020 9.10  3.40 - 10.80 10*3/mm3 Final   • RBC 10/05/2020 3.72* 3.77 - 5.28 10*6/mm3 Final   • Hemoglobin 10/05/2020 10.3* 12.0 - 15.9 g/dL Final   • Hematocrit 10/05/2020 29.4* 34.0 - 46.6 % Final   • MCV 10/05/2020 79.0  79.0 - 97.0 fL Final   • MCH 10/05/2020 27.7  26.6 - 33.0 pg Final   • MCHC 10/05/2020 35.0  31.5 - 35.7 g/dL Final   • RDW 10/05/2020 14.5  12.3 - 15.4 % Final   • RDW-SD 10/05/2020 41.3  37.0 - 54.0 fl Final   • MPV 10/05/2020 12.7* 6.0 - 12.0 fL Final   • Platelets 10/05/2020 170  140 - 450 10*3/mm3 Final   Initial Prenatal on 10/05/2020   Component Date Value Ref Range Status   • HIV Screen 4th Gen w/RFX (Referenc* 05/14/2020 non reactive   Final   • Urine Culture 05/14/2020 No growth at 24 hours  No growth at 24 hours, No growth at 2 days, No growth at 3 days, No growth, Growth present, too young to evaluate, Culture in progress Final   • External Hemoglobin 09/17/2020 10.8  g/dL Final   • Glucose, GTT - 1 Hour 09/17/2020 122  74 - 180 mg/dL Final   • Rubella Antibodies, IgG 05/14/2020 Immune   Final   • External ABO Grouping 05/14/2020 B   Final   • External Rh Factor 05/14/2020 Positive   Final   • External Hemoglobin  05/14/2020 11.9  g/dL Final   • External Hematocrit 05/14/2020 35  % Final   • External Antibody Screen 05/14/2020 Normal  Normal Final   • External Hepatitis B Surface Ag 05/14/2020 Negative   Final   • Opiate Screen 05/14/2020 Negative  Negative Final   • Cocaine Screen, Urine 05/14/2020 negative   Final   • THC, Screen, Urine 05/14/2020 Positive* Negative Final   • External Amphetamine Screen Urine 05/14/2020 Negative   Final   • External Barbiturate Screen Urine 05/14/2020 negative   Final   • External Benzodiazepine Screen Uri* 05/14/2020 Negative   Final   • External Phencyclidine Screen Urine 05/14/2020 negative   Final   • External Hematocrit 09/17/2020 31  % Final   • Hep C Virus Ab 05/14/2020 negative   Final   • Glucose 10/05/2020 96  65 - 99 mg/dL Final   • BUN 10/05/2020 5* 6 - 20 mg/dL Final   • Creatinine 10/05/2020 0.63  0.57 - 1.00 mg/dL Final   • Sodium 10/05/2020 134* 136 - 145 mmol/L Final   • Potassium 10/05/2020 3.8  3.5 - 5.2 mmol/L Final   • Chloride 10/05/2020 103  98 - 107 mmol/L Final   • CO2 10/05/2020 21.7* 22.0 - 29.0 mmol/L Final   • Calcium 10/05/2020 9.7  8.6 - 10.5 mg/dL Final   • Total Protein 10/05/2020 7.1  6.0 - 8.5 g/dL Final   • Albumin 10/05/2020 3.80  3.50 - 5.20 g/dL Final   • ALT (SGPT) 10/05/2020 19  1 - 33 U/L Final   • AST (SGOT) 10/05/2020 23  1 - 32 U/L Final   • Alkaline Phosphatase 10/05/2020 121* 39 - 117 U/L Final   • Total Bilirubin 10/05/2020 0.3  0.0 - 1.2 mg/dL Final   • eGFR  African Amer 10/05/2020 133  >60 mL/min/1.73 Final   • Globulin 10/05/2020 3.3  gm/dL Final   • A/G Ratio 10/05/2020 1.2  g/dL Final   • BUN/Creatinine Ratio 10/05/2020 7.9  7.0 - 25.0 Final   • Anion Gap 10/05/2020 9.3  5.0 - 15.0 mmol/L Final   • THC, Screen, Urine 10/05/2020 Negative  Negative Final   • Phencyclidine (PCP), Urine 10/05/2020 Negative  Negative Final   • Cocaine Screen, Urine 10/05/2020 Negative  Negative Final   • Methamphetamine, Ur 10/05/2020 Negative  Negative Final    • Opiate Screen 10/05/2020 Negative  Negative Final   • Amphetamine Screen, Urine 10/05/2020 Negative  Negative Final   • Benzodiazepine Screen, Urine 10/05/2020 Negative  Negative Final   • Tricyclic Antidepressants Screen 10/05/2020 Negative  Negative Final   • Methadone Screen, Urine 10/05/2020 Negative  Negative Final   • Barbiturates Screen, Urine 10/05/2020 Negative  Negative Final   • Oxycodone Screen, Urine 10/05/2020 Negative  Negative Final   • Propoxyphene Screen 10/05/2020 Negative  Negative Final   • Buprenorphine, Screen, Urine 10/05/2020 Negative  Negative Final      No image results found.    [unfilled]  Immunization History   Administered Date(s) Administered   • Tdap 09/19/2018, 10/05/2020       The following portions of the patient's history were reviewed and updated as appropriate: allergies, current medications, past family history, past medical history, past social history, past surgical history and problem list.    Physical Exam  Physical Exam  Constitutional:       Appearance: Normal appearance. She is obese.   HENT:      Head: Normocephalic and atraumatic.      Right Ear: Tympanic membrane and ear canal normal.      Left Ear: Tympanic membrane and ear canal normal.   Eyes:      General:         Right eye: No discharge.         Left eye: No discharge.      Conjunctiva/sclera: Conjunctivae normal.   Cardiovascular:      Rate and Rhythm: Normal rate and regular rhythm.      Pulses: Normal pulses.      Heart sounds: Normal heart sounds. No murmur heard.     Pulmonary:      Effort: Pulmonary effort is normal. No respiratory distress.      Breath sounds: Normal breath sounds.   Abdominal:      General: There is no distension.      Palpations: Abdomen is soft.      Tenderness: There is no abdominal tenderness.   Musculoskeletal:      Cervical back: Normal range of motion.   Lymphadenopathy:      Cervical: No cervical adenopathy.   Neurological:      Mental Status: She is alert. Mental status  is at baseline.   Psychiatric:         Mood and Affect: Mood normal.         Behavior: Behavior normal.         Assessment/Plan    Diagnosis Plan   1. Encounter for smoking cessation counseling  buPROPion SR (Wellbutrin SR) 150 MG 12 hr tablet   2. Elevated BP without diagnosis of hypertension     3. Morbidly obese (CMS/HCC)        No orders of the defined types were placed in this encounter.    Smoke cessation; after prolonged discussion, will start on Wellbutrin, follow-up in 1 month.    Elevated blood pressure without diagnosis of hypertension; patient to complete blood pressure log at home, bring into follow-up in 1 month.  No red flags, reassuring.    Obesity; patient currently undergoing weight loss program, encouraged, consider bariatric surgery at follow-up if wanted.      This document has been electronically signed by Bull Greene MD on March 26, 2021 14:22 CDT

## 2021-04-30 ENCOUNTER — OFFICE VISIT (OUTPATIENT)
Dept: FAMILY MEDICINE CLINIC | Facility: CLINIC | Age: 31
End: 2021-04-30

## 2021-04-30 VITALS
BODY MASS INDEX: 50.05 KG/M2 | WEIGHT: 272 LBS | HEART RATE: 95 BPM | SYSTOLIC BLOOD PRESSURE: 130 MMHG | DIASTOLIC BLOOD PRESSURE: 82 MMHG | HEIGHT: 62 IN | RESPIRATION RATE: 18 BRPM | OXYGEN SATURATION: 100 %

## 2021-04-30 DIAGNOSIS — Z71.6 ENCOUNTER FOR SMOKING CESSATION COUNSELING: Primary | ICD-10-CM

## 2021-04-30 DIAGNOSIS — E66.01 MORBIDLY OBESE (HCC): ICD-10-CM

## 2021-04-30 DIAGNOSIS — R03.0 ELEVATED BP WITHOUT DIAGNOSIS OF HYPERTENSION: ICD-10-CM

## 2021-04-30 PROBLEM — N83.209 CYST OF OVARY: Status: ACTIVE | Noted: 2021-04-30

## 2021-04-30 PROBLEM — A60.00 GENITAL HERPES SIMPLEX: Status: ACTIVE | Noted: 2021-04-30

## 2021-04-30 PROBLEM — D57.3 SICKLE CELL TRAIT (HCC): Status: ACTIVE | Noted: 2021-04-30

## 2021-04-30 PROBLEM — J45.909 ASTHMA: Status: ACTIVE | Noted: 2021-04-30

## 2021-04-30 PROBLEM — Z34.90 PREGNANCY: Status: ACTIVE | Noted: 2021-04-30

## 2021-04-30 PROCEDURE — 99213 OFFICE O/P EST LOW 20 MIN: CPT | Performed by: STUDENT IN AN ORGANIZED HEALTH CARE EDUCATION/TRAINING PROGRAM

## 2021-07-29 ENCOUNTER — TELEPHONE (OUTPATIENT)
Dept: FAMILY MEDICINE CLINIC | Facility: CLINIC | Age: 31
End: 2021-07-29

## 2021-08-13 ENCOUNTER — OFFICE VISIT (OUTPATIENT)
Dept: FAMILY MEDICINE CLINIC | Facility: CLINIC | Age: 31
End: 2021-08-13

## 2021-08-13 VITALS
DIASTOLIC BLOOD PRESSURE: 74 MMHG | HEART RATE: 97 BPM | WEIGHT: 267.4 LBS | BODY MASS INDEX: 49.21 KG/M2 | TEMPERATURE: 97.1 F | SYSTOLIC BLOOD PRESSURE: 122 MMHG | OXYGEN SATURATION: 100 % | HEIGHT: 62 IN

## 2021-08-13 DIAGNOSIS — M54.42 ACUTE LEFT-SIDED LOW BACK PAIN WITH LEFT-SIDED SCIATICA: Primary | ICD-10-CM

## 2021-08-13 PROCEDURE — 99213 OFFICE O/P EST LOW 20 MIN: CPT | Performed by: STUDENT IN AN ORGANIZED HEALTH CARE EDUCATION/TRAINING PROGRAM

## 2021-08-13 RX ORDER — PREDNISONE 50 MG/1
50 TABLET ORAL DAILY
Qty: 5 TABLET | Refills: 0 | Status: SHIPPED | OUTPATIENT
Start: 2021-08-13 | End: 2021-09-10

## 2021-08-13 RX ORDER — METHOCARBAMOL 500 MG/1
500 TABLET, FILM COATED ORAL 4 TIMES DAILY
Qty: 90 TABLET | Refills: 1 | Status: SHIPPED | OUTPATIENT
Start: 2021-08-13 | End: 2021-09-30 | Stop reason: SDUPTHER

## 2021-09-10 ENCOUNTER — TELEMEDICINE (OUTPATIENT)
Dept: FAMILY MEDICINE CLINIC | Facility: TELEHEALTH | Age: 31
End: 2021-09-10

## 2021-09-10 DIAGNOSIS — J45.21 MILD INTERMITTENT ASTHMA WITH EXACERBATION: Primary | ICD-10-CM

## 2021-09-10 PROCEDURE — 99213 OFFICE O/P EST LOW 20 MIN: CPT | Performed by: NURSE PRACTITIONER

## 2021-09-10 RX ORDER — ALBUTEROL SULFATE 2.5 MG/3ML
2.5 SOLUTION RESPIRATORY (INHALATION) EVERY 4 HOURS PRN
Qty: 180 ML | Refills: 0 | Status: SHIPPED | OUTPATIENT
Start: 2021-09-10 | End: 2021-10-10

## 2021-09-10 RX ORDER — ALBUTEROL SULFATE 90 UG/1
2 AEROSOL, METERED RESPIRATORY (INHALATION) EVERY 4 HOURS PRN
Qty: 18 G | Refills: 0 | Status: SHIPPED | OUTPATIENT
Start: 2021-09-10 | End: 2021-11-16

## 2021-09-10 RX ORDER — METHYLPREDNISOLONE 4 MG/1
TABLET ORAL
Qty: 21 TABLET | Refills: 0 | Status: SHIPPED | OUTPATIENT
Start: 2021-09-10 | End: 2021-09-30

## 2021-09-10 NOTE — PROGRESS NOTES
CHIEF COMPLAINT  Chief Complaint   Patient presents with   • Asthma         HPI  Jackelyn Lopez is a 31 y.o. female  presents with complaint of recent cold (neg for COVID-19) and 1 day history of cough, shortness of breath, chest tightness, wheezing.  Just used last of albuterol inhaler, also has nebuliizer but out of solution.   Denies fever    Review of Systems   Constitutional: Positive for activity change and fatigue. Negative for fever.   HENT: Negative for congestion.    Respiratory: Positive for cough, chest tightness, shortness of breath and wheezing.    All other systems reviewed and are negative.      Past Medical History:   Diagnosis Date   • Chlamydia    • Genital herpes simplex 4/30/2021   • Gestational hypertension    • Preeclampsia    • Urogenital trichomoniasis        Family History   Problem Relation Age of Onset   • Hypertension Mother    • Diabetes Mother    • COPD Maternal Grandmother        Social History     Socioeconomic History   • Marital status: Single     Spouse name: Not on file   • Number of children: Not on file   • Years of education: Not on file   • Highest education level: Not on file   Tobacco Use   • Smoking status: Current Every Day Smoker     Packs/day: 0.15     Years: 15.00     Pack years: 2.25   • Smokeless tobacco: Never Used   Vaping Use   • Vaping Use: Never used   Substance and Sexual Activity   • Alcohol use: Not Currently   • Drug use: Not Currently     Types: Marijuana   • Sexual activity: Yes     Partners: Male     Birth control/protection: None         There were no vitals taken for this visit.    PHYSICAL EXAM  Physical Exam   Constitutional: She is oriented to person, place, and time. She appears well-developed and well-nourished. She does not have a sickly appearance. She does not appear ill.   HENT:   Head: Normocephalic and atraumatic.   Pulmonary/Chest: Effort normal. Respiratory distress: persistent cough during visit with audible wheeze.  Neurological:  She is alert and oriented to person, place, and time.         Diagnoses and all orders for this visit:    1. Mild intermittent asthma with exacerbation (Primary)  -     albuterol sulfate  (90 Base) MCG/ACT inhaler; Inhale 2 puffs Every 4 (Four) Hours As Needed for Wheezing.  Dispense: 18 g; Refill: 0  -     albuterol (PROVENTIL) (2.5 MG/3ML) 0.083% nebulizer solution; Take 2.5 mg by nebulization Every 4 (Four) Hours As Needed for Wheezing for up to 30 days.  Dispense: 180 mL; Refill: 0  -     methylPREDNISolone (MEDROL) 4 MG dose pack; Take as directed on package instructions.  Dispense: 21 tablet; Refill: 0    --albuterol inhaler and nebulizer treatments as prescribed  --take Medrol dose pack as directed on package.  --increase fluid intake  --if no improvement in 5-7 days, will need follow-up for further evaluation      FOLLOW-UP  As discussed during visit with PCP/[facility] if no improvement or Urgent Care/Emergency Department if worsening of symptoms    Patient verbalizes understanding of medication dosage, comfort measures, instructions for treatment and follow-up.    Silvia Zayas, POPPY  09/10/2021  10:00 EDT    This visit was performed via Telehealth.  This patient has been instructed to follow-up with their primary care provider if their symptoms worsen or the treatment provided does not resolve their illness.

## 2021-09-10 NOTE — PATIENT INSTRUCTIONS
"http://www.aaaai.org/conditions-and-treatments/asthma\">   Asthma, Adult    Asthma is a long-term (chronic) condition that causes recurrent episodes in which the airways become tight and narrow. The airways are the passages that lead from the nose and mouth down into the lungs. Asthma episodes, also called asthma attacks, can cause coughing, wheezing, shortness of breath, and chest pain. The airways can also fill with mucus. During an attack, it can be difficult to breathe. Asthma attacks can range from minor to life threatening.  Asthma cannot be cured, but medicines and lifestyle changes can help control it and treat acute attacks.  What are the causes?  This condition is believed to be caused by inherited (genetic) and environmental factors, but its exact cause is not known.  There are many things that can bring on an asthma attack or make asthma symptoms worse (triggers). Asthma triggers are different for each person. Common triggers include:  · Mold.  · Dust.  · Cigarette smoke.  · Cockroaches.  · Things that can cause allergy symptoms (allergens), such as animal dander or pollen from trees or grass.  · Air pollutants such as household , wood smoke, smog, or chemical odors.  · Cold air, weather changes, and winds (which increase molds and pollen in the air).  · Strong emotional expressions such as crying or laughing hard.  · Stress.  · Certain medicines (such as aspirin) or types of medicines (such as beta-blockers).  · Sulfites in foods and drinks. Foods and drinks that may contain sulfites include dried fruit, potato chips, and sparkling grape juice.  · Infections or inflammatory conditions such as the flu, a cold, or inflammation of the nasal membranes (rhinitis).  · Gastroesophageal reflux disease (GERD).  · Exercise or strenuous activity.  What are the signs or symptoms?  Symptoms of this condition may occur right after asthma is triggered or many hours later. Symptoms include:  · Wheezing. This can " sound like whistling when you breathe.  · Excessive nighttime or early morning coughing.  · Frequent or severe coughing with a common cold.  · Chest tightness.  · Shortness of breath.  · Tiredness (fatigue) with minimal activity.  How is this diagnosed?  This condition is diagnosed based on:  · Your medical history.  · A physical exam.  · Tests, which may include:  ? Lung function studies and pulmonary studies (spirometry). These tests can evaluate the flow of air in your lungs.  ? Allergy tests.  ? Imaging tests, such as X-rays.  How is this treated?  There is no cure for this condition, but treatment can help control your symptoms. Treatment for asthma usually involves:  · Identifying and avoiding your asthma triggers.  · Using medicines to control your symptoms. Generally, two types of medicines are used to treat asthma:  ? Controller medicines. These help prevent asthma symptoms from occurring. They are usually taken every day.  ? Fast-acting reliever or rescue medicines. These quickly relieve asthma symptoms by widening the narrow and tight airways. They are used as needed and provide short-term relief.  · Using supplemental oxygen. This may be needed during a severe episode.  · Using other medicines, such as:  ? Allergy medicines, such as antihistamines, if your asthma attacks are triggered by allergens.  ? Immune medicines (immunomodulators). These are medicines that help control the immune system.  · Creating an asthma action plan. An asthma action plan is a written plan for managing and treating your asthma attacks. This plan includes:  ? A list of your asthma triggers and how to avoid them.  ? Information about when medicines should be taken and when their dosage should be changed.  ? Instructions about using a device called a peak flow meter. A peak flow meter measures how well the lungs are working and the severity of your asthma. It helps you monitor your condition.  Follow these instructions at  home:  Controlling your home environment  Control your home environment in the following ways to help avoid triggers and prevent asthma attacks:  · Change your heating and air conditioning filter regularly.  · Limit your use of fireplaces and wood stoves.  · Get rid of pests (such as roaches and mice) and their droppings.  · Throw away plants if you see mold on them.  · Clean floors and dust surfaces regularly. Use unscented cleaning products.  · Try to have someone else vacuum for you regularly. Stay out of rooms while they are being vacuumed and for a short while afterward. If you vacuum, use a dust mask from a hardware store, a double-layered or microfilter vacuum  bag, or a vacuum  with a HEPA filter.  · Replace carpet with wood, tile, or vinyl benja. Carpet can trap dander and dust.  · Use allergy-proof pillows, mattress covers, and box spring covers.  · Keep your bedroom a trigger-free room.  · Avoid pets and keep windows closed when allergens are in the air.  · Wash beddings every week in hot water and dry them in a dryer.  · Use blankets that are made of polyester or cotton.  · Clean bathrooms and mariangel with bleach. If possible, have someone repaint the walls in these rooms with mold-resistant paint. Stay out of the rooms that are being cleaned and painted.  · Wash your hands often with soap and water. If soap and water are not available, use hand .  · Do not allow anyone to smoke in your home.  General instructions  · Take over-the-counter and prescription medicines only as told by your health care provider.  ? Speak with your health care provider if you have questions about how or when to take the medicines.  ? Make note if you are requiring more frequent dosages.  · Do not use any products that contain nicotine or tobacco, such as cigarettes and e-cigarettes. If you need help quitting, ask your health care provider. Also, avoid being exposed to secondhand smoke.  · Use a peak  flow meter as told by your health care provider. Record and keep track of the readings.  · Understand and use the asthma action plan to help minimize, or stop an asthma attack, without needing to seek medical care.  · Make sure you stay up to date on your yearly vaccinations as told by your health care provider. This may include vaccines for the flu and pneumonia.  · Avoid outdoor activities when allergen counts are high and when air quality is low.  · Wear a ski mask that covers your nose and mouth during outdoor winter activities. Exercise indoors on cold days if you can.  · Warm up before exercising, and take time for a cool-down period after exercise.  · Keep all follow-up visits as told by your health care provider. This is important.  Where to find more information  · For information about asthma, turn to the Centers for Disease Control and Prevention at www.cdc.gov/asthma/faqs.htm  · For air quality information, turn to [employer]Now at https://airnow.gov/  Contact a health care provider if:  · You have wheezing, shortness of breath, or a cough even while you are taking medicine to prevent attacks.  · The mucus you cough up (sputum) is thicker than usual.  · Your sputum changes from clear or white to yellow, green, gray, or bloody.  · Your medicines are causing side effects, such as a rash, itching, swelling, or trouble breathing.  · You need to use a reliever medicine more than 2-3 times a week.  · Your peak flow reading is still at 50-79% of your personal best after following your action plan for 1 hour.  · You have a fever.  Get help right away if:  · You are getting worse and do not respond to treatment during an asthma attack.  · You are short of breath when at rest or when doing very little physical activity.  · You have difficulty eating, drinking, or talking.  · You have chest pain or tightness.  · You develop a fast heartbeat or palpitations.  · You have a bluish color to your lips or fingernails.  · You  are light-headed or dizzy, or you faint.  · Your peak flow reading is less than 50% of your personal best.  · You feel too tired to breathe normally.  Summary  · Asthma is a long-term (chronic) condition that causes recurrent episodes in which the airways become tight and narrow. These episodes can cause coughing, wheezing, shortness of breath, and chest pain.  · Asthma cannot be cured, but medicines and lifestyle changes can help control it and treat acute attacks.  · Make sure you understand how to avoid triggers and how and when to use your medicines.  · Asthma attacks can range from minor to life threatening. Get help right away if you have an asthma attack and do not respond to treatment with your usual rescue medicines.  This information is not intended to replace advice given to you by your health care provider. Make sure you discuss any questions you have with your health care provider.  Document Revised: 04/21/2021 Document Reviewed: 04/21/2021  Elsevier Patient Education © 2021 [employer]

## 2021-09-28 ENCOUNTER — TELEPHONE (OUTPATIENT)
Dept: FAMILY MEDICINE CLINIC | Facility: CLINIC | Age: 31
End: 2021-09-28

## 2021-09-28 DIAGNOSIS — M54.42 ACUTE LEFT-SIDED LOW BACK PAIN WITH LEFT-SIDED SCIATICA: Primary | ICD-10-CM

## 2021-09-28 NOTE — TELEPHONE ENCOUNTER
Pt brought in paperwork for a functional capacity. This has to be filled out by Occupational Therapy. Sent a referral to occupational therapy for the functional capacity. Sent paperwork to medical records so that they can have them and left the message on the referral as well.

## 2021-09-30 ENCOUNTER — OFFICE VISIT (OUTPATIENT)
Dept: FAMILY MEDICINE CLINIC | Facility: CLINIC | Age: 31
End: 2021-09-30

## 2021-09-30 VITALS
OXYGEN SATURATION: 99 % | TEMPERATURE: 97.7 F | SYSTOLIC BLOOD PRESSURE: 102 MMHG | HEART RATE: 91 BPM | BODY MASS INDEX: 49.56 KG/M2 | WEIGHT: 269.3 LBS | HEIGHT: 62 IN | DIASTOLIC BLOOD PRESSURE: 78 MMHG

## 2021-09-30 DIAGNOSIS — G89.29 CHRONIC LEFT-SIDED LOW BACK PAIN WITH LEFT-SIDED SCIATICA: Primary | ICD-10-CM

## 2021-09-30 DIAGNOSIS — E66.01 CLASS 3 SEVERE OBESITY WITHOUT SERIOUS COMORBIDITY WITH BODY MASS INDEX (BMI) OF 45.0 TO 49.9 IN ADULT, UNSPECIFIED OBESITY TYPE (HCC): ICD-10-CM

## 2021-09-30 DIAGNOSIS — M54.42 CHRONIC LEFT-SIDED LOW BACK PAIN WITH LEFT-SIDED SCIATICA: Primary | ICD-10-CM

## 2021-09-30 DIAGNOSIS — M54.42 ACUTE LEFT-SIDED LOW BACK PAIN WITH LEFT-SIDED SCIATICA: ICD-10-CM

## 2021-09-30 DIAGNOSIS — Z71.6 ENCOUNTER FOR SMOKING CESSATION COUNSELING: ICD-10-CM

## 2021-09-30 PROCEDURE — 99214 OFFICE O/P EST MOD 30 MIN: CPT | Performed by: STUDENT IN AN ORGANIZED HEALTH CARE EDUCATION/TRAINING PROGRAM

## 2021-09-30 RX ORDER — METHOCARBAMOL 500 MG/1
500 TABLET, FILM COATED ORAL 4 TIMES DAILY
Qty: 90 TABLET | Refills: 1 | Status: SHIPPED | OUTPATIENT
Start: 2021-09-30

## 2021-09-30 RX ORDER — NICOTINE 21 MG/24HR
1 PATCH, TRANSDERMAL 24 HOURS TRANSDERMAL EVERY 24 HOURS
Qty: 28 EACH | Refills: 1 | Status: SHIPPED | OUTPATIENT
Start: 2021-09-30 | End: 2022-01-04

## 2021-09-30 RX ORDER — POLYETHYLENE GLYCOL 3350 17 G
2 POWDER IN PACKET (EA) ORAL AS NEEDED
Qty: 72 EACH | Refills: 3 | Status: SHIPPED | OUTPATIENT
Start: 2021-09-30 | End: 2022-01-04

## 2021-09-30 RX ORDER — IBUPROFEN 800 MG/1
800 TABLET ORAL EVERY 8 HOURS SCHEDULED
Qty: 60 TABLET | Refills: 2 | Status: SHIPPED | OUTPATIENT
Start: 2021-09-30

## 2021-10-05 ENCOUNTER — APPOINTMENT (OUTPATIENT)
Dept: MRI IMAGING | Facility: HOSPITAL | Age: 31
End: 2021-10-05

## 2021-10-08 ENCOUNTER — HOSPITAL ENCOUNTER (OUTPATIENT)
Dept: MRI IMAGING | Facility: HOSPITAL | Age: 31
Discharge: HOME | End: 2021-10-08
Admitting: STUDENT IN AN ORGANIZED HEALTH CARE EDUCATION/TRAINING PROGRAM

## 2021-10-08 DIAGNOSIS — M54.42 CHRONIC LEFT-SIDED LOW BACK PAIN WITH LEFT-SIDED SCIATICA: ICD-10-CM

## 2021-10-08 DIAGNOSIS — G89.29 CHRONIC LEFT-SIDED LOW BACK PAIN WITH LEFT-SIDED SCIATICA: ICD-10-CM

## 2021-10-08 PROCEDURE — 72148 MRI LUMBAR SPINE W/O DYE: CPT

## 2021-10-11 ENCOUNTER — LAB (OUTPATIENT)
Dept: LAB | Facility: HOSPITAL | Age: 31
End: 2021-10-11

## 2021-10-11 ENCOUNTER — TELEPHONE (OUTPATIENT)
Dept: FAMILY MEDICINE CLINIC | Facility: CLINIC | Age: 31
End: 2021-10-11

## 2021-10-11 ENCOUNTER — OFFICE VISIT (OUTPATIENT)
Dept: OBSTETRICS AND GYNECOLOGY | Facility: CLINIC | Age: 31
End: 2021-10-11

## 2021-10-11 VITALS
WEIGHT: 264 LBS | SYSTOLIC BLOOD PRESSURE: 130 MMHG | BODY MASS INDEX: 48.58 KG/M2 | DIASTOLIC BLOOD PRESSURE: 72 MMHG | HEIGHT: 62 IN

## 2021-10-11 DIAGNOSIS — Z12.4 ENCOUNTER FOR PAPANICOLAOU SMEAR FOR CERVICAL CANCER SCREENING: ICD-10-CM

## 2021-10-11 DIAGNOSIS — R10.2 PELVIC PAIN: Primary | ICD-10-CM

## 2021-10-11 LAB — CYTOLOGY CVX/VAG DOC THIN PREP: NORMAL

## 2021-10-11 PROCEDURE — 87510 GARDNER VAG DNA DIR PROBE: CPT | Performed by: NURSE PRACTITIONER

## 2021-10-11 PROCEDURE — 87591 N.GONORRHOEAE DNA AMP PROB: CPT | Performed by: NURSE PRACTITIONER

## 2021-10-11 PROCEDURE — 87491 CHLMYD TRACH DNA AMP PROBE: CPT | Performed by: NURSE PRACTITIONER

## 2021-10-11 PROCEDURE — 87480 CANDIDA DNA DIR PROBE: CPT | Performed by: NURSE PRACTITIONER

## 2021-10-11 PROCEDURE — 99213 OFFICE O/P EST LOW 20 MIN: CPT | Performed by: NURSE PRACTITIONER

## 2021-10-11 PROCEDURE — 87661 TRICHOMONAS VAGINALIS AMPLIF: CPT | Performed by: NURSE PRACTITIONER

## 2021-10-11 PROCEDURE — 87660 TRICHOMONAS VAGIN DIR PROBE: CPT | Performed by: NURSE PRACTITIONER

## 2021-10-11 NOTE — TELEPHONE ENCOUNTER
Patient called and was asking about referral for Occupational therapy.. said that she called [facility] and they have not received anything about this.. She would like to know what is going on.. please call back

## 2021-10-11 NOTE — PROGRESS NOTES
Subjective   Jackelyn Lopez is a 31 y.o. here for left sided pelvic pain    Jackelyn Lopez is a  who presents today for left sided pelvic pain. The pain started on 10/5, a day after her last day of her period. It started as stomach pain and pt thought it was gas but then the pain became localized to the left side of her pelvis and is sharp. Pain is worse with voiding and BM. Does not have dysuria. Has spotting and pt does not normally spot between her periods. Pt is, however, taking Micronor. Taking tylenol and ibuprofen and meds help some. Has not had sex in the last 1.5 months.  Pt is due for her pap smear.       The following portions of the patient's history were reviewed and updated as appropriate: allergies, current medications, past family history, past medical history, past social history, past surgical history and problem list.    Review of Systems   Constitutional: Negative for chills, fatigue, fever, unexpected weight gain and unexpected weight loss.   Respiratory: Negative for shortness of breath.    Cardiovascular: Negative for chest pain and palpitations.   Gastrointestinal: Negative for abdominal pain, constipation, diarrhea and nausea.   Genitourinary: Positive for pelvic pain. Negative for difficulty urinating, dysuria, frequency, menstrual problem, pelvic pressure, urinary incontinence, vaginal bleeding, vaginal discharge and vaginal pain.   Skin: Negative for rash.   Neurological: Negative for weakness and headache.       Objective   Physical Exam  Exam conducted with a chaperone present.   Constitutional:       Appearance: She is obese.   Pulmonary:      Effort: Pulmonary effort is normal.   Genitourinary:     General: Normal vulva.      Labia:         Right: No rash, tenderness, lesion or injury.         Left: No rash, tenderness, lesion or injury.       Vagina: Normal.      Cervix: Discharge present. No friability, lesion, erythema, cervical bleeding or eversion.      Rectum:  Normal.      Comments: Pap smear, G/C, and Vag panel collected. Poorly tolerated swabs. Bimanual exam deferred due to pt's pain.     Minimal blood-tinged cervical discharge.   Neurological:      Mental Status: She is alert.           Assessment/Plan   Diagnoses and all orders for this visit:    1. Pelvic pain (Primary)  -     Chlamydia trachomatis, Neisseria gonorrhoeae, Trichomonas vaginalis, PCR - Swab, Cervix  -     Gardnerella vaginalis, Trichomonas vaginalis, Candida albicans, DNA - Swab, Vagina  -     US Non-ob Transvaginal; Future    2. Encounter for Papanicolaou smear for cervical cancer screening  -     Cancel: Liquid-based Pap Smear, Screening; Future  -     Liquid-based Pap Smear, Screening; Future  -     Liquid-based Pap Smear, Screening        Soonest pelvic ultrasound appt with [facility] is on Wednesday at 3pm. Reviewed possible ovarian cysts with left sided pelvic pain; ovarian torsion precautions. Pt does not want to go the ER for further evaluation at this time. Will try comfort measures at home; discussed 1gm of tylenol q 6 hrs and 800mg of ibuprofen every 8 hrs, heating paid. Will rule out infection at this time. Will call pt with results of pelvic ultrasound when available.

## 2021-10-12 LAB
CANDIDA ALBICANS: NEGATIVE
GARDNERELLA VAGINALIS: POSITIVE
T VAGINALIS DNA VAG QL PROBE+SIG AMP: NEGATIVE

## 2021-10-12 RX ORDER — METRONIDAZOLE 500 MG/1
500 TABLET ORAL 2 TIMES DAILY
Qty: 14 TABLET | Refills: 0 | Status: SHIPPED | OUTPATIENT
Start: 2021-10-12 | End: 2021-10-19

## 2021-10-13 ENCOUNTER — TELEPHONE (OUTPATIENT)
Dept: OBSTETRICS AND GYNECOLOGY | Facility: CLINIC | Age: 31
End: 2021-10-13

## 2021-10-13 ENCOUNTER — TELEPHONE (OUTPATIENT)
Dept: FAMILY MEDICINE CLINIC | Facility: CLINIC | Age: 31
End: 2021-10-13

## 2021-10-13 ENCOUNTER — CLINICAL SUPPORT (OUTPATIENT)
Dept: OBSTETRICS AND GYNECOLOGY | Facility: CLINIC | Age: 31
End: 2021-10-13

## 2021-10-13 DIAGNOSIS — A54.9 NEISSERIA GONORRHOEAE: Primary | ICD-10-CM

## 2021-10-13 DIAGNOSIS — Z86.19 HISTORY OF GONORRHEA: ICD-10-CM

## 2021-10-13 DIAGNOSIS — Z11.3 ROUTINE SCREENING FOR STI (SEXUALLY TRANSMITTED INFECTION): Primary | ICD-10-CM

## 2021-10-13 LAB
C TRACH RRNA CVX QL NAA+PROBE: NEGATIVE
N GONORRHOEA RRNA SPEC QL NAA+PROBE: POSITIVE
TRICHOMONAS VAGINALIS PCR: NEGATIVE

## 2021-10-13 PROCEDURE — 96372 THER/PROPH/DIAG INJ SC/IM: CPT | Performed by: NURSE PRACTITIONER

## 2021-10-13 RX ORDER — CEFTRIAXONE SODIUM 250 MG/1
250 INJECTION, POWDER, FOR SOLUTION INTRAMUSCULAR; INTRAVENOUS ONCE
Status: COMPLETED | OUTPATIENT
Start: 2021-10-13 | End: 2021-10-13

## 2021-10-13 RX ORDER — CEFTRIAXONE 1 G/1
0.25 INJECTION, POWDER, FOR SOLUTION INTRAMUSCULAR; INTRAVENOUS ONCE
Status: COMPLETED | OUTPATIENT
Start: 2021-10-13 | End: 2021-10-13

## 2021-10-13 RX ADMIN — CEFTRIAXONE SODIUM 250 MG: 250 INJECTION, POWDER, FOR SOLUTION INTRAMUSCULAR; INTRAVENOUS at 10:42

## 2021-10-13 RX ADMIN — CEFTRIAXONE 0.25 G: 1 INJECTION, POWDER, FOR SOLUTION INTRAMUSCULAR; INTRAVENOUS at 15:42

## 2021-10-13 NOTE — PROGRESS NOTES
Informed patient that she is positive for gonorrhea. Pt is getting rocephin 500mg IM dose today at [facility]. Retest in 3 months. G/C by urine ordered placed and pt given instructions. Follow-up if pt does not feel an improvement in symptoms by next week. Instructed that partner needs to be tested and treated and avoid sex for at least 7 days. Pt also instructed to keep pelvic ultrasound appt today to rule out other causes of her pelvic pain.

## 2021-10-13 NOTE — TELEPHONE ENCOUNTER
Spoke to patient. Had received 250mg of Rocephin; pt needs additional 250 mg for adequate treatment for gonorrhea. Rx sent to pharmacy; pt will bring rx and get 2nd shot later today. Return in 4 weeks for f/u and VIKY. Pt states her partner went to the ER and got treated.

## 2021-10-14 DIAGNOSIS — R10.2 PELVIC PAIN: ICD-10-CM

## 2021-10-15 LAB
LAB AP CASE REPORT: NORMAL
PATH INTERP SPEC-IMP: NORMAL

## 2021-11-11 ENCOUNTER — OFFICE VISIT (OUTPATIENT)
Dept: OBSTETRICS AND GYNECOLOGY | Facility: CLINIC | Age: 31
End: 2021-11-11

## 2021-11-11 VITALS
HEIGHT: 62 IN | BODY MASS INDEX: 48.03 KG/M2 | DIASTOLIC BLOOD PRESSURE: 76 MMHG | SYSTOLIC BLOOD PRESSURE: 134 MMHG | WEIGHT: 261 LBS

## 2021-11-11 DIAGNOSIS — Z86.19 HISTORY OF GONORRHEA: Primary | ICD-10-CM

## 2021-11-11 PROCEDURE — 87491 CHLMYD TRACH DNA AMP PROBE: CPT | Performed by: NURSE PRACTITIONER

## 2021-11-11 PROCEDURE — 87661 TRICHOMONAS VAGINALIS AMPLIF: CPT | Performed by: NURSE PRACTITIONER

## 2021-11-11 PROCEDURE — 87591 N.GONORRHOEAE DNA AMP PROB: CPT | Performed by: NURSE PRACTITIONER

## 2021-11-11 PROCEDURE — 99212 OFFICE O/P EST SF 10 MIN: CPT | Performed by: NURSE PRACTITIONER

## 2021-11-11 NOTE — PROGRESS NOTES
Subjective   Jackelyn Lopez is a 31 y.o. here for gyn follow-up    Jackelyn Lopez is a 31 yr old  who presents today for follow-up for test of cure for gonorrhea. Was treated with 500mg of Rocephin on  10/13. Pt feels much better; no longer having pelvic pain. Has not been sexually active since getting treated. Her partner of 6 years was treated. Pt takes Micronor for BC; patient is less than half a pack a day cigarette smoke but also uses nicotine patches. Is happy with her birth control pill.        The following portions of the patient's history were reviewed and updated as appropriate: allergies, current medications, past family history, past medical history, past social history, past surgical history and problem list.    Review of Systems   Constitutional: Negative for chills, fatigue and fever.   Respiratory: Negative for shortness of breath.    Cardiovascular: Negative for chest pain and palpitations.   Gastrointestinal: Negative for abdominal pain, constipation, diarrhea and nausea.   Genitourinary: Negative for dysuria, frequency, menstrual problem, pelvic pressure, vaginal bleeding, vaginal discharge and vaginal pain.   Skin: Negative for rash.   Neurological: Negative for headache.   Psychiatric/Behavioral: Negative for depressed mood.       Objective   Physical Exam  Exam conducted with a chaperone present.   Genitourinary:     General: Normal vulva.      Labia:         Right: No rash, tenderness, lesion or injury.         Left: No rash, tenderness, lesion or injury.       Vagina: Normal.      Cervix: Normal.      Comments: G/C/T swab collected.   Neurological:      Mental Status: She is alert.   Psychiatric:         Mood and Affect: Mood normal.         Behavior: Behavior normal.           Assessment/Plan   Diagnoses and all orders for this visit:    1. History of gonorrhea (Primary)  -     Chlamydia trachomatis, Neisseria gonorrhoeae, Trichomonas vaginalis, PCR - Swab,  Cervix        Call patient with results of swab. Counseled that she can call if she desires future STI screening, if she has concerns. Counseled on ensuring she takes her Micronor on time; forgetting more than 3 hours from the time she is suppose to take it can decrease her risk. Pt states she has an alarm; is not interested in other types birth control. Informed patient that her pap is normal and good for 5 years. Return as needed; encouraged annual gyn visits.

## 2021-11-12 ENCOUNTER — TELEPHONE (OUTPATIENT)
Dept: OBSTETRICS AND GYNECOLOGY | Facility: CLINIC | Age: 31
End: 2021-11-12

## 2021-11-12 LAB
C TRACH RRNA CVX QL NAA+PROBE: NEGATIVE
N GONORRHOEA RRNA SPEC QL NAA+PROBE: NEGATIVE
TRICHOMONAS VAGINALIS PCR: NEGATIVE

## 2021-11-12 NOTE — TELEPHONE ENCOUNTER
----- Message from POPPY Pretty sent at 11/12/2021  9:20 AM CST -----  Let her now that her G/C is negative.

## 2021-11-15 RX ORDER — NORETHINDRONE 0.35 MG/1
TABLET ORAL
Qty: 84 TABLET | Refills: 0 | Status: SHIPPED | OUTPATIENT
Start: 2021-11-15 | End: 2022-02-07

## 2021-11-16 ENCOUNTER — OFFICE VISIT (OUTPATIENT)
Dept: FAMILY MEDICINE CLINIC | Facility: CLINIC | Age: 31
End: 2021-11-16

## 2021-11-16 VITALS
BODY MASS INDEX: 48.4 KG/M2 | HEART RATE: 91 BPM | SYSTOLIC BLOOD PRESSURE: 108 MMHG | WEIGHT: 263 LBS | HEIGHT: 62 IN | TEMPERATURE: 97.5 F | OXYGEN SATURATION: 100 % | DIASTOLIC BLOOD PRESSURE: 68 MMHG

## 2021-11-16 DIAGNOSIS — E66.01 MORBIDLY OBESE (HCC): ICD-10-CM

## 2021-11-16 DIAGNOSIS — J30.2 SEASONAL ALLERGIES: Primary | ICD-10-CM

## 2021-11-16 DIAGNOSIS — J45.21 MILD INTERMITTENT ASTHMA WITH EXACERBATION: ICD-10-CM

## 2021-11-16 PROCEDURE — 99213 OFFICE O/P EST LOW 20 MIN: CPT | Performed by: STUDENT IN AN ORGANIZED HEALTH CARE EDUCATION/TRAINING PROGRAM

## 2021-11-16 RX ORDER — ALBUTEROL SULFATE 90 UG/1
2 AEROSOL, METERED RESPIRATORY (INHALATION) EVERY 4 HOURS PRN
Qty: 18 G | Refills: 2 | Status: SHIPPED | OUTPATIENT
Start: 2021-11-16 | End: 2022-01-04 | Stop reason: SDUPTHER

## 2021-11-16 RX ORDER — ALBUTEROL SULFATE 2.5 MG/3ML
2.5 SOLUTION RESPIRATORY (INHALATION) EVERY 4 HOURS PRN
Qty: 30 EACH | Refills: 5 | Status: SHIPPED | OUTPATIENT
Start: 2021-11-16 | End: 2022-01-04 | Stop reason: SDUPTHER

## 2021-11-16 RX ORDER — ALBUTEROL SULFATE 2.5 MG/3ML
2.5 SOLUTION RESPIRATORY (INHALATION) EVERY 4 HOURS PRN
COMMUNITY
End: 2021-11-16 | Stop reason: SDUPTHER

## 2021-11-16 RX ORDER — MONTELUKAST SODIUM 10 MG/1
10 TABLET ORAL NIGHTLY
Qty: 90 TABLET | Refills: 1 | Status: SHIPPED | OUTPATIENT
Start: 2021-11-16 | End: 2022-04-11 | Stop reason: SDUPTHER

## 2021-11-16 NOTE — PATIENT INSTRUCTIONS
Steps to Quit Smoking  Smoking tobacco is the leading cause of preventable death. It can affect almost every organ in the body. Smoking puts you and people around you at risk for many serious, long-lasting (chronic) diseases. Quitting smoking can be hard, but it is one of the best things that you can do for your health. It is never too late to quit.  How do I get ready to quit?  When you decide to quit smoking, make a plan to help you succeed. Before you quit:  · Pick a date to quit. Set a date within the next 2 weeks to give you time to prepare.  · Write down the reasons why you are quitting. Keep this list in places where you will see it often.  · Tell your family, friends, and co-workers that you are quitting. Their support is important.  · Talk with your doctor about the choices that may help you quit.  · Find out if your health insurance will pay for these treatments.  · Know the people, places, things, and activities that make you want to smoke (triggers). Avoid them.  What first steps can I take to quit smoking?  · Throw away all cigarettes at home, at work, and in your car.  · Throw away the things that you use when you smoke, such as ashtrays and lighters.  · Clean your car. Make sure to empty the ashtray.  · Clean your home, including curtains and carpets.  What can I do to help me quit smoking?  Talk with your doctor about taking medicines and seeing a counselor at the same time. You are more likely to succeed when you do both.  · If you are pregnant or breastfeeding, talk with your doctor about counseling or other ways to quit smoking. Do not take medicine to help you quit smoking unless your doctor tells you to do so.  To quit smoking:  Quit right away  · Quit smoking totally, instead of slowly cutting back on how much you smoke over a period of time.  · Go to counseling. You are more likely to quit if you go to counseling sessions regularly.  Take medicine  You may take medicines to help you quit. Some  medicines need a prescription, and some you can buy over-the-counter. Some medicines may contain a drug called nicotine to replace the nicotine in cigarettes. Medicines may:  · Help you to stop having the desire to smoke (cravings).  · Help to stop the problems that come when you stop smoking (withdrawal symptoms).  Your doctor may ask you to use:  · Nicotine patches, gum, or lozenges.  · Nicotine inhalers or sprays.  · Non-nicotine medicine that is taken by mouth.  Find resources  Find resources and other ways to help you quit smoking and remain smoke-free after you quit. These resources are most helpful when you use them often. They include:  · Online chats with a counselor.  · Phone quitlines.  · Printed self-help materials.  · Support groups or group counseling.  · Text messaging programs.  · Mobile phone apps. Use apps on your mobile phone or tablet that can help you stick to your quit plan. There are many free apps for mobile phones and tablets as well as websites. Examples include Quit Guide from the CDC and smokefree.gov    What things can I do to make it easier to quit?    · Talk to your family and friends. Ask them to support and encourage you.  · Call a phone quitline (1-548-QUITNOW), reach out to support groups, or work with a counselor.  · Ask people who smoke to not smoke around you.  · Avoid places that make you want to smoke, such as:  ? Bars.  ? Parties.  ? Smoke-break areas at work.  · Spend time with people who do not smoke.  · Lower the stress in your life. Stress can make you want to smoke. Try these things to help your stress:  ? Getting regular exercise.  ? Doing deep-breathing exercises.  ? Doing yoga.  ? Meditating.  ? Doing a body scan. To do this, close your eyes, focus on one area of your body at a time from head to toe. Notice which parts of your body are tense. Try to relax the muscles in those areas.  How will I feel when I quit smoking?  Day 1 to 3 weeks  Within the first 24 hours,  you may start to have some problems that come from quitting tobacco. These problems are very bad 2-3 days after you quit, but they do not often last for more than 2-3 weeks. You may get these symptoms:  · Mood swings.  · Feeling restless, nervous, angry, or annoyed.  · Trouble concentrating.  · Dizziness.  · Strong desire for high-sugar foods and nicotine.  · Weight gain.  · Trouble pooping (constipation).  · Feeling like you may vomit (nausea).  · Coughing or a sore throat.  · Changes in how the medicines that you take for other issues work in your body.  · Depression.  · Trouble sleeping (insomnia).  Week 3 and afterward  After the first 2-3 weeks of quitting, you may start to notice more positive results, such as:  · Better sense of smell and taste.  · Less coughing and sore throat.  · Slower heart rate.  · Lower blood pressure.  · Clearer skin.  · Better breathing.  · Fewer sick days.  Quitting smoking can be hard. Do not give up if you fail the first time. Some people need to try a few times before they succeed. Do your best to stick to your quit plan, and talk with your doctor if you have any questions or concerns.  Summary  · Smoking tobacco is the leading cause of preventable death. Quitting smoking can be hard, but it is one of the best things that you can do for your health.  · When you decide to quit smoking, make a plan to help you succeed.  · Quit smoking right away, not slowly over a period of time.  · When you start quitting, seek help from your doctor, family, or friends.  This information is not intended to replace advice given to you by your health care provider. Make sure you discuss any questions you have with your health care provider.  Document Revised: 09/11/2020 Document Reviewed: 03/07/2020  [employer] Patient Education © 2021 [employer]      Obesity, Adult  Obesity is having too much body fat. Being obese means that your weight is more than what is healthy for you.  BMI is a number that  explains how much body fat you have. If you have a BMI of 30 or more, you are obese. Obesity is often caused by eating or drinking more calories than your body uses. Changing your lifestyle can help you lose weight.  Obesity can cause serious health problems, such as:  · Stroke.  · Coronary artery disease (CAD).  · Type 2 diabetes.  · Some types of cancer, including cancers of the colon, breast, uterus, and gallbladder.  · Osteoarthritis.  · High blood pressure (hypertension).  · High cholesterol.  · Sleep apnea.  · Gallbladder stones.  · Infertility problems.  What are the causes?  · Eating meals each day that are high in calories, sugar, and fat.  · Being born with genes that may make you more likely to become obese.  · Having a medical condition that causes obesity.  · Taking certain medicines.  · Sitting a lot (having a sedentary lifestyle).  · Not getting enough sleep.  · Drinking a lot of drinks that have sugar in them.  What increases the risk?  · Having a family history of obesity.  · Being an  woman.  · Being a  man.  · Living in an area with limited access to:  ? [place], recreation centers, or sidewalks.  ? Healthy food choices, such as grocery stores and farmers' markets.  What are the signs or symptoms?  The main sign is having too much body fat.  How is this treated?  · Treatment for this condition often includes changing your lifestyle. Treatment may include:  ? Changing your diet. This may include making a healthy meal plan.  ? Exercise. This may include activity that causes your heart to beat faster (aerobic exercise) and strength training. Work with your doctor to design a program that works for you.  ? Medicine to help you lose weight. This may be used if you are not able to lose 1 pound a week after 6 weeks of healthy eating and more exercise.  ? Treating conditions that cause the obesity.  ? Surgery. Options may include gastric banding and gastric bypass. This may be done  if:  § Other treatments have not helped to improve your condition.  § You have a BMI of 40 or higher.  § You have life-threatening health problems related to obesity.  Follow these instructions at home:  Eating and drinking    · Follow advice from your doctor about what to eat and drink. Your doctor may tell you to:  ? Limit fast food, sweets, and processed snack foods.  ? Choose low-fat options. For example, choose low-fat milk instead of whole milk.  ? Eat 5 or more servings of fruits or vegetables each day.  ? Eat at home more often. This gives you more control over what you eat.  ? Choose healthy foods when you eat out.  ? Learn to read food labels. This will help you learn how much food is in 1 serving.  ? Keep low-fat snacks available.  ? Avoid drinks that have a lot of sugar in them. These include soda, fruit juice, iced tea with sugar, and flavored milk.  · Drink enough water to keep your pee (urine) pale yellow.  · Do not go on fad diets.    Physical activity  · Exercise often, as told by your doctor. Most adults should get up to 150 minutes of moderate-intensity exercise every week.Ask your doctor:  ? What types of exercise are safe for you.  ? How often you should exercise.  · Warm up and stretch before being active.  · Do slow stretching after being active (cool down).  · Rest between times of being active.  Lifestyle  · Work with your doctor and a food expert (dietitian) to set a weight-loss goal that is best for you.  · Limit your screen time.  · Find ways to reward yourself that do not involve food.  · Do not drink alcohol if:  ? Your doctor tells you not to drink.  ? You are pregnant, may be pregnant, or are planning to become pregnant.  · If you drink alcohol:  ? Limit how much you use to:  § 0-1 drink a day for women.  § 0-2 drinks a day for men.  ? Be aware of how much alcohol is in your drink. In the [place] one drink equals one 12 oz bottle of beer (355 mL), one 5 oz glass of wine (148 mL), or one  1½ oz glass of hard liquor (44 mL).  General instructions  · Keep a weight-loss journal. This can help you keep track of:  ? The food that you eat.  ? How much exercise you get.  · Take over-the-counter and prescription medicines only as told by your doctor.  · Take vitamins and supplements only as told by your doctor.  · Think about joining a support group.  · Keep all follow-up visits as told by your doctor. This is important.  Contact a doctor if:  · You cannot meet your weight loss goal after you have changed your diet and lifestyle for 6 weeks.  Get help right away if you:  · Are having trouble breathing.  · Are having thoughts of harming yourself.  Summary  · Obesity is having too much body fat.  · Being obese means that your weight is more than what is healthy for you.  · Work with your doctor to set a weight-loss goal.  · Get regular exercise as told by your doctor.  This information is not intended to replace advice given to you by your health care provider. Make sure you discuss any questions you have with your health care provider.  Document Revised: 08/22/2019 Document Reviewed: 08/22/2019  [employer] Patient Education © 2021 [employer]

## 2021-11-16 NOTE — PROGRESS NOTES
"Subjective:  Jackelyn Lopez is a 31 y.o. female who presents for     Smoking cessation; currently a pack of cigarettes will last her ~ 2 weeks. No issues with nicotine replacement therapy, states providing good relief.  Denied nausea, vomiting, diarrhea, headaches, vision changes, chest pain, shortness of breath, palpitations.    Obesity; has upcoming appointment for bariatric surgery. Has been working on diet, portion control, exercise, weight loss and intermittent fasting.  With minimal weight loss, does not want to try medications such as phentermine for weight loss due to concerns for adverse effects.    Asthma; has albuterol as needed, aggravated with allergies, does not take medication for seasonal allergies.  Will occasionally use over-the-counter medication but does not take regularly.  Admits to rhinorrhea, congestion.  Denied shortness of breath, chest pain, fever, chills, loss of taste, loss of smell, headaches.  Denies nighttime awakenings, increased dyspnea on exertion.    Patient Active Problem List   Diagnosis   • Morbidly obese (HCC)   • Asthma   • Cyst of ovary   • Genital herpes simplex   • Pregnancy   • Sickle cell trait (HCC)     Vitals:    Vitals:    11/16/21 1705   BP: 108/68   BP Location: Right arm   Patient Position: Sitting   Cuff Size: Large Adult   Pulse: 91   Temp: 97.5 °F (36.4 °C)   SpO2: 100%   Weight: 119 kg (263 lb)   Height: 157.5 cm (62\")     Body mass index is 48.1 kg/m².      Current Outpatient Medications:   •  albuterol (PROVENTIL) (2.5 MG/3ML) 0.083% nebulizer solution, Take 2.5 mg by nebulization Every 4 (Four) Hours As Needed for Wheezing., Disp: 30 each, Rfl: 5  •  albuterol sulfate  (90 Base) MCG/ACT inhaler, Inhale 2 puffs Every 4 (Four) Hours As Needed for Wheezing., Disp: 18 g, Rfl: 2  •  Gaby 0.35 MG tablet, TAKE ONE TABLET BY MOUTH DAILY, Disp: 84 tablet, Rfl: 0  •  ibuprofen (ADVIL,MOTRIN) 800 MG tablet, Take 1 tablet by mouth Every 8 (Eight) Hours " **Take with food**, Disp: 60 tablet, Rfl: 2  •  methocarbamol (Robaxin) 500 MG tablet, Take 1 tablet by mouth 4 (Four) Times a Day., Disp: 90 tablet, Rfl: 1  •  nicotine (Nicotine Step 1) 21 MG/24HR patch, Place 1 patch on the skin as directed by provider Daily., Disp: 28 each, Rfl: 1  •  nicotine (Nicotine Step 2) 14 MG/24HR patch, Place 1 patch on the skin as directed by provider Daily., Disp: 28 each, Rfl: 1  •  nicotine (Nicotine Step 3) 7 MG/24HR patch, Place 1 patch on the skin as directed by provider Daily., Disp: 28 each, Rfl: 1  •  nicotine polacrilex (Nicotine Mini) 2 MG lozenge, Dissolve 1 lozenge in the mouth As Needed for Smoking Cessation., Disp: 72 each, Rfl: 3  •  omeprazole (PrilOSEC) 40 MG capsule, Take 1 capsule by mouth Daily., Disp: 30 capsule, Rfl: 12  •  montelukast (Singulair) 10 MG tablet, Take 1 tablet by mouth Every Night., Disp: 90 tablet, Rfl: 1    Patient Active Problem List   Diagnosis   • Morbidly obese (HCC)   • Asthma   • Cyst of ovary   • Genital herpes simplex   • Pregnancy   • Sickle cell trait (HCC)     Past Surgical History:   Procedure Laterality Date   •  SECTION N/A 2020    Procedure:  SECTION PRIMARY;  Surgeon: Nikolai Mtz DO;  Location: [facility] LABOR DELIVERY;  Service: Obstetrics;  Laterality: N/A;   •  SECTION  2020   • CHOLECYSTECTOMY       Social History     Socioeconomic History   • Marital status: Single   Tobacco Use   • Smoking status: Current Every Day Smoker     Packs/day: 0.25     Years: 15.00     Pack years: 3.75     Types: Cigarettes   • Smokeless tobacco: Never Used   Vaping Use   • Vaping Use: Never used   Substance and Sexual Activity   • Alcohol use: Not Currently     Alcohol/week: 0.0 standard drinks   • Drug use: Not Currently     Types: Marijuana   • Sexual activity: Yes     Partners: Male     Birth control/protection: Other     Family History   Problem Relation Age of Onset   • Hypertension Mother    •  Diabetes Mother    • Stroke Mother    • COPD Maternal Grandmother      Office Visit on 11/11/2021   Component Date Value Ref Range Status   • Chlamydia DNA by PCR 11/11/2021 Negative  Negative Final   • Neisseria gonorrhoeae by PCR 11/11/2021 Negative  Negative Final   • Trichomonas vaginalis PCR 11/11/2021 Negative  Negative, Invalid Final   Office Visit on 10/11/2021   Component Date Value Ref Range Status   • Chlamydia DNA by PCR 10/11/2021 Negative  Negative Final   • Neisseria gonorrhoeae by PCR 10/11/2021 Positive* Negative Final   • Trichomonas vaginalis PCR 10/11/2021 Negative  Negative, Invalid Final   • NATALIE ALBICANS 10/11/2021 Negative  Negative Final   • GARDNERELLA VAGINALIS 10/11/2021 Positive* Negative Final   • TRICHOMONAS VAGINALIS 10/11/2021 Negative  Negative Final   • Case Report 10/11/2021    Final                    Value:Gynecologic Cytology Report                       Case: [id number]                                  Authorizing Provider:  Myrtle Still APRN   Collected:           10/11/2021 01:42 PM          Ordering Location:     [facility]   Received:            10/11/2021 03:48 PM                                 [facility]                                                         First Screen:          Virginie Ross                                                               Specimen:    Sendout to [facility], Cervix                                                                    • Interpretation 10/11/2021 See attached report   Final      MRI Lumbar Spine Without Contrast  Narrative: EXAM: MRI LUMBAR SPINE WO CONTRAST     CLINICAL INDICATION: Low back pain    COMPARISON: 8/13/2021    TECHNIQUE: The MRI was done using sagittal and axial T1 and  T2-weighted images and sagittal STIR images.    FINDINGS: The vertebral body alignment and marrow signal are  normal. There is no marrow replacing bone lesion, bone marrow  edema or compression deformity. The conus  medullaris and cauda  equina have unremarkable morphology and characteristics.    The intervertebral discs appear normal with no significant disc  bulge, disc extrusion, central canal stenosis, or neural foramen  narrowing at any level.  Impression: Normal MRI of the lumbar spine.          Electronically signed by:  Arturo Callahan MD  10/9/2021 4:44 PM CDT  Workstation: [id number]      [unfilled]  Immunization History   Administered Date(s) Administered   • Tdap 09/19/2018, 10/05/2020     The following portions of the patient's history were reviewed and updated as appropriate: allergies, current medications, past family history, past medical history, past social history, past surgical history and problem list.    PHQ-9 Total Score:           Physical Exam  Constitutional:       Appearance: Normal appearance. She is obese.   HENT:      Head: Normocephalic and atraumatic.      Right Ear: External ear normal.      Left Ear: External ear normal.   Eyes:      General:         Right eye: No discharge.         Left eye: No discharge.      Conjunctiva/sclera: Conjunctivae normal.   Cardiovascular:      Rate and Rhythm: Normal rate and regular rhythm.      Pulses: Normal pulses.      Heart sounds: Normal heart sounds. No murmur heard.      Pulmonary:      Effort: Pulmonary effort is normal. No respiratory distress.      Breath sounds: Normal breath sounds. No wheezing.   Abdominal:      General: There is no distension.      Palpations: Abdomen is soft.      Tenderness: There is no abdominal tenderness.   Musculoskeletal:      Cervical back: Normal range of motion.      Right lower leg: No edema.      Left lower leg: No edema.   Lymphadenopathy:      Cervical: No cervical adenopathy.   Neurological:      Mental Status: She is alert. Mental status is at baseline.   Psychiatric:         Mood and Affect: Mood normal.         Behavior: Behavior normal.         Assessment/Plan    Diagnosis Plan   1. Seasonal allergies  albuterol  (PROVENTIL) (2.5 MG/3ML) 0.083% nebulizer solution    montelukast (Singulair) 10 MG tablet   2. Mild intermittent asthma with exacerbation  albuterol sulfate  (90 Base) MCG/ACT inhaler   3. Morbidly obese (HCC)        No orders of the defined types were placed in this encounter.    Seasonal allergies/asthma; will refill as above, add on Singulair for allergies to hopefully prevent future exacerbations.  Counseled patient, voiced understanding, agreeable to plan.  Exam reassuring, vital stable.    Obesity; currently undergoing evaluation for bariatric surgery, continue supervised diet, work on portion control, encouraged nonweightbearing exercises as patient with chronic low back pain made worse with weightbearing exercises.  Patient would be a good candidate for bariatric surgery due to BMI and comorbid history of asthma.  Follow-up in 1 month, sooner if needed.          This document has been electronically signed by Bull Greene MD on November 16, 2021 19:42 CST

## 2021-11-30 ENCOUNTER — OFFICE VISIT (OUTPATIENT)
Dept: BARIATRICS/WEIGHT MGMT | Facility: CLINIC | Age: 31
End: 2021-11-30

## 2021-11-30 ENCOUNTER — OFFICE VISIT (OUTPATIENT)
Dept: BARIATRICS/WEIGHT MGMT | Facility: HOSPITAL | Age: 31
End: 2021-11-30

## 2021-11-30 VITALS
TEMPERATURE: 98 F | OXYGEN SATURATION: 97 % | DIASTOLIC BLOOD PRESSURE: 87 MMHG | HEART RATE: 87 BPM | BODY MASS INDEX: 47.31 KG/M2 | SYSTOLIC BLOOD PRESSURE: 132 MMHG | WEIGHT: 267 LBS | HEIGHT: 63 IN

## 2021-11-30 DIAGNOSIS — E66.01 CLASS 3 SEVERE OBESITY DUE TO EXCESS CALORIES WITH SERIOUS COMORBIDITY AND BODY MASS INDEX (BMI) OF 45.0 TO 49.9 IN ADULT (HCC): Primary | ICD-10-CM

## 2021-11-30 PROCEDURE — 99203 OFFICE O/P NEW LOW 30 MIN: CPT | Performed by: SURGERY

## 2021-11-30 NOTE — PROGRESS NOTES
"Nutrition Services    Patient Name:  Jackelyn Lopez  YOB: 1990  MRN: [id number]  Admit Date:  (Not on file)    NUTRITION BARIATRIC/[facility] NOTE     Visit 1  Initial Assessment   BA#   [facility]    Anthropometrics   Height: 63 in  Weight: 267 lbs  BMI: 47.30    Nutrition Recall  Eating _1-2__ meals daily   Snacking  Limited sweet intake   Drinking carbonated beverages-rare  Drinking less than 64 fluid ounces-?  Drinking greater to or equal to 64 fluid ounces-?    Education    Goal Setting and Information Packet  4 meal per day diet plan  4 meal per day sample menu  \"Perfect Protein, Fiber in Foods, and Reducing Fat\"  Reinforce Nutritional Needs for Surgery  Reinforce Nutritional Needs for [facility]    Nutrition Goals   Eat ___4__ meals per day with protein  Protein goal: 65gms    discussed protein guidelines for shakes and bar  Eliminate snacks  Healthier food choices  Portion control / Use smaller plate or measuring cup   Eliminate soda  Increase fluid intake to 64 ounces per day      Electronically signed by:  Mimi Almaraz  11/30/21 11:11 CST   "

## 2021-11-30 NOTE — PROGRESS NOTES
Patient Care Team:  Bull Greene MD as PCP - General (General Practice)    Reason for Visit:  Surgical Weight loss    Subjective      Jackelyn Lopez is a pleasant 31 y.o. female and presents with morbid obesity with her Body mass index is 47.3 kg/m².    She is here for discussion of weight loss options.  She stated she has been with the disease of obesity for year(s).  She stated she suffers from morbid obesity due to her weight gain.  She stated that weight loss helps alleviate these symptoms.   She stated that she has tried multiple diet regimens including fasting, counting calories and Wellbutrin to help with weight loss.  She stated that she has attempted these conservative methods for weight loss without maintaining long term success.  Today she would like to discuss surgical weight loss options such as the Laparoscopic Sleeve Gastrectomy or the Laparoscopic R - Y Gastric Bypass.      Review of Systems  General ROS: positive for  - fatigue and weight gain  Psychological ROS: negative  Respiratory ROS: no cough, shortness of breath, or wheezing  Cardiovascular ROS: no chest pain or dyspnea on exertion  Gastrointestinal ROS: no abdominal pain, change in bowel habits, or black or bloody stools  Genito-Urinary ROS: no dysuria, trouble voiding, or hematuria  Musculoskeletal ROS: positive for - joint pain     History  Past Medical History:   Diagnosis Date   • Asthma    • Chlamydia    • Genital herpes simplex 2021   • Gestational hypertension    • Preeclampsia    • Sickle cell anemia (HCC)    • Urogenital trichomoniasis      Past Surgical History:   Procedure Laterality Date   •  SECTION N/A 2020    Procedure:  SECTION PRIMARY;  Surgeon: Nikolai Mtz DO;  Location: [facility] LABOR DELIVERY;  Service: Obstetrics;  Laterality: N/A;   •  SECTION  2020   • CHOLECYSTECTOMY  2012    lap     Family History   Problem Relation Age of Onset   • Hypertension  Mother    • Diabetes Mother    • Stroke Mother    • Obesity Mother    • COPD Maternal Grandmother    • Hypertension Maternal Grandmother      Social History     Tobacco Use   • Smoking status: Current Every Day Smoker     Packs/day: 0.25     Years: 15.00     Pack years: 3.75     Types: Cigarettes   • Smokeless tobacco: Never Used   Vaping Use   • Vaping Use: Never used   Substance Use Topics   • Alcohol use: Not Currently     Alcohol/week: 0.0 standard drinks   • Drug use: Not Currently     Types: Marijuana     E-cigarette/Vaping   • E-cigarette/Vaping Use Never User      E-cigarette/Vaping Substances     (Not in a hospital admission)    Allergies:  Azithromycin      Current Outpatient Medications:   •  albuterol sulfate  (90 Base) MCG/ACT inhaler, Inhale 2 puffs Every 4 (Four) Hours As Needed for Wheezing., Disp: 18 g, Rfl: 2  •  Gaby 0.35 MG tablet, TAKE ONE TABLET BY MOUTH DAILY, Disp: 84 tablet, Rfl: 0  •  ibuprofen (ADVIL,MOTRIN) 800 MG tablet, Take 1 tablet by mouth Every 8 (Eight) Hours **Take with food**, Disp: 60 tablet, Rfl: 2  •  methocarbamol (Robaxin) 500 MG tablet, Take 1 tablet by mouth 4 (Four) Times a Day., Disp: 90 tablet, Rfl: 1  •  montelukast (Singulair) 10 MG tablet, Take 1 tablet by mouth Every Night., Disp: 90 tablet, Rfl: 1  •  nicotine (Nicotine Step 3) 7 MG/24HR patch, Place 1 patch on the skin as directed by provider Daily., Disp: 28 each, Rfl: 1  •  nicotine polacrilex (Nicotine Mini) 2 MG lozenge, Dissolve 1 lozenge in the mouth As Needed for Smoking Cessation., Disp: 72 each, Rfl: 3  •  omeprazole (PrilOSEC) 40 MG capsule, Take 1 capsule by mouth Daily., Disp: 30 capsule, Rfl: 12  •  albuterol (PROVENTIL) (2.5 MG/3ML) 0.083% nebulizer solution, Take 2.5 mg by nebulization Every 4 (Four) Hours As Needed for Wheezing., Disp: 30 each, Rfl: 5  •  nicotine (Nicotine Step 1) 21 MG/24HR patch, Place 1 patch on the skin as directed by provider Daily., Disp: 28 each, Rfl: 1  •  nicotine  "(Nicotine Step 2) 14 MG/24HR patch, Place 1 patch on the skin as directed by provider Daily., Disp: 28 each, Rfl: 1    Objective     Vital Signs  Temp:  [98 °F (36.7 °C)] 98 °F (36.7 °C)  Heart Rate:  [87] 87  BP: (132)/(87) 132/87  Body mass index is 47.3 kg/m².      11/30/21  1006   Weight: 121 kg (267 lb)       General Appearance:  awake, alert, oriented, in no acute distress  Lungs:  Normal expansion.  Clear to auscultation.  No rales, rhonchi, or wheezing.  Heart:  Heart regular rate and rhythm  Abdomen:  Soft, non-tender, normal bowel sounds; no bruits, organomegaly or masses.  Abnormal shape: obese  Extremities: Extremities warm to touch, pink, with no edema.      Results Review:   None        Assessment/Plan   Encounter Diagnoses   Name Primary?   • Class 3 severe obesity due to excess calories with serious comorbidity and body mass index (BMI) of 45.0 to 49.9 in adult (HCC) Yes       She has been provided a structured dietary regimen based off of her behavior.  I discussed with the patient the etiology of the disease of obesity and the potential comorbid conditions associated with this disease.  She was instructed to follow the dietary regimen and follow-up with our program in 1 month's time with any additional questions as they may arise during this time.  We emphasized on focusing on proteins and meals high in fiber as well as adequate hydration that exceed 64 ounces of water daily.  I recommended the patient record daily a food journal that would incorporate what she is eating and how many times she is eating during the day.  My recommendation included at least 21 consecutive days of recording of these meals.  I explained that I anticipate the patient to lose weight prior to her next monthly visit.  I have also explained that they need to record or document when they are going to have the \"cheat day\" as well as create a food journal to help monitor their behavior and food choices.  The patient was also " "made aware that if they inappropriately follow the dietary prescription there is a risk of weight gain.    She has been provided a structured dietary regimen based off of her behavior.  I discussed with the patient the etiology of the disease of obesity and the potential comorbid conditions associated with this disease.  She was instructed to follow the dietary regimen and follow-up with our program in 1 month's time with any additional questions as they may arise during this time.  We emphasized on focusing on proteins and meals high in fiber as well as adequate hydration that exceed 64 ounces of water daily.  I recommended the patient record daily a food journal that would incorporate what she is eating and how many times she is eating during the day.  My recommendation included at least 21 consecutive days of recording of these meals.  I explained that I anticipate the patient to lose weight prior to her next monthly visit.  I have also explained that they need to record or document when they are going to have the \"cheat day\" as well as create a food journal to help monitor their behavior and food choices.  The patient was also made aware that if they inappropriately follow the dietary prescription there is a risk of weight gain.      I discussed the patient's findings and my recommendations with patient. The patient was made aware that we are primarily a surgical program.  We reviewed different weight loss surgical procedures including the laparoscopic sleeve gastrectomy, gastric band and Linda-en-Y gastric bypass.  I explained to the patient that medical treatment alone is ineffective for long-term results and for the reversal of morbid obesity. She and I discussed the etiology of the disease of morbid obesity.  I emphasized that weight loss through conservative methods alone statistically will not reverse this disease of obesity long-term.    Our program is not a \"weight loss program\" but focuses on the overall " issue of morbid obesity and the patient has been educated on what steps will be necessary to be successful in reversing this disease of morbid obesity at our facility.  The patient will require further evaluation of her foregut either through an upper endoscopy/EGD or radiographic studies.  I have explained the 3 pearls of our program for the patient to follow to optimize success:1.  Putting their health first, 2.  Not trying to treat the disease on their own, 3.  Attempting to make their scheduled appointments.  The patient was in agreement to following these recommendations.  The patient was also notified that our dietitian will be contacting them soon for follow-up on how they are doing with the new prescription.    I have also recommended that she obtain preoperative cardiac and psychological risk assessments prior to surgery consideration.      Dr. Joon Childress MD FACS    11/30/21  10:30 CST  Patient Care Team:  Bull Greene MD as PCP - General (General Practice)

## 2021-12-01 ENCOUNTER — PATIENT ROUNDING (BHMG ONLY) (OUTPATIENT)
Dept: BARIATRICS/WEIGHT MGMT | Facility: CLINIC | Age: 31
End: 2021-12-01

## 2021-12-01 NOTE — PROGRESS NOTES
December 1, 2021    Hello, may I speak with Jackelyn Lopez?    My name is Diana       I am  with [facility] BARIATRIC & GENERAL SURGERY  [address], [address]  [place] [address] [phone].    Before we get started may I verify your date of birth? 1990    I am calling to officially welcome you to our practice and ask about your recent visit. Is this a good time to talk? Yes    Tell me about your visit with us. What things went well?  All went well.       We're always looking for ways to make our patients' experiences even better. Do you have recommendations on ways we may improve?  No ma'am it was great.    Overall were you satisfied with your first visit to our practice? Very much so everyone was nice.       I appreciate you taking the time to speak with me today. Is there anything else I can do for you? No ma'am      Thank you, and have a great day.

## 2021-12-01 NOTE — PROGRESS NOTES
December 1, 2021    Hello, may I speak with Jackelyn Lopez?    My name is Francoise      I am  with [facility] BARIATRIC & GENERAL SURGERY  [address]  [place] [address] [phone].    Before we get started may I verify your date of birth? 1990    I am calling to officially welcome you to our practice and ask about your recent visit. Is this a good time to talk? Left message for a return call.     Tell me about your visit with us. What things went well?  Left message for a return call.     We're always looking for ways to make our patients' experiences even better. Do you have recommendations on ways we may improve?  Left message for a return call.    Overall were you satisfied with your first visit to our practice? Left message for a return call.       I appreciate you taking the time to speak with me today. Is there anything else I can do for you? Left message for a return call.      Thank you, and have a great day.

## 2021-12-27 ENCOUNTER — OFFICE VISIT (OUTPATIENT)
Dept: BARIATRICS/WEIGHT MGMT | Facility: CLINIC | Age: 31
End: 2021-12-27

## 2021-12-27 VITALS
BODY MASS INDEX: 46.78 KG/M2 | DIASTOLIC BLOOD PRESSURE: 83 MMHG | OXYGEN SATURATION: 99 % | HEIGHT: 63 IN | SYSTOLIC BLOOD PRESSURE: 138 MMHG | HEART RATE: 82 BPM | TEMPERATURE: 97.6 F | WEIGHT: 264 LBS

## 2021-12-27 DIAGNOSIS — Z87.891 HISTORY OF NICOTINE USE: ICD-10-CM

## 2021-12-27 DIAGNOSIS — E66.01 CLASS 3 SEVERE OBESITY DUE TO EXCESS CALORIES WITH SERIOUS COMORBIDITY AND BODY MASS INDEX (BMI) OF 45.0 TO 49.9 IN ADULT (HCC): Primary | ICD-10-CM

## 2021-12-27 PROCEDURE — 99213 OFFICE O/P EST LOW 20 MIN: CPT | Performed by: SURGERY

## 2021-12-27 NOTE — PROGRESS NOTES
"Patient Care Team:  Bull Greene MD as PCP - General (General Practice)    Reason for Visit:  Surgical Weight loss      Subjective   Jackelyn Lopez is a 31 y.o. female.     Jackelyn is here for follow-up and continued medical management of her morbid obesity.  She is currently on a prescription diet.  Jackelyn previously was to apply dietary changes such as following the meal plan as directed.  She admits to struggling to follow the dietary prescription consistently.  She is incorporating see groups for her last meal and not consuming vegetables/B group for breakfast.  As a result she no significant change in weight since her last visit.    Review Of Systems:  General ROS: negative    The following portions of the patient's history were reviewed and updated as appropriate: allergies, current medications, past family history, past medical history, past social history, past surgical history and problem list.    Objective   /83 (BP Location: Right arm, Patient Position: Sitting, Cuff Size: Thigh Adult)   Pulse 82   Temp 97.6 °F (36.4 °C)   Ht 160 cm (63\")   Wt 120 kg (264 lb)   SpO2 99%   BMI 46.77 kg/m²       12/27/21  1018   Weight: 120 kg (264 lb)       General Appearance:  awake, alert, oriented, in no acute distress  Abdomen:  Soft, non-tender, normal bowel sounds; no bruits, organomegaly or masses.  Abnormal shape: obese    Assessment/Plan     Encounter Diagnoses   Name Primary?   • Class 3 severe obesity due to excess calories with serious comorbidity and body mass index (BMI) of 45.0 to 49.9 in adult ([facility]) Yes       Jackelyn Lopez was seen today for follow-up, obesity, nutrition counseling and weight loss.  She has no significant change in weight since her last visit.  Today we discussed healthy changes in lifestyle, diet, and exercise. Dietician consultation obtained.  Jackelyn Lopez had received handouts to her explaining the recommendation on portion " sizes/appetite control/reading nutrition labels.   Intensive behavioral therapy for obesity was done today as well.   Goals for this month are: I reviewed the dietary prescription with the patient and modified her goals.  I have recommended that she incorporate vegetables with each meal and to avoid eating meals from group C.  She is to also bring her binder with every visit and record a food journal as directed.    Follow up in one month for a weight recheck.

## 2022-01-04 ENCOUNTER — OFFICE VISIT (OUTPATIENT)
Dept: FAMILY MEDICINE CLINIC | Facility: CLINIC | Age: 32
End: 2022-01-04

## 2022-01-04 VITALS
HEIGHT: 63 IN | OXYGEN SATURATION: 99 % | WEIGHT: 264.1 LBS | BODY MASS INDEX: 46.79 KG/M2 | HEART RATE: 98 BPM | SYSTOLIC BLOOD PRESSURE: 118 MMHG | TEMPERATURE: 98.9 F | DIASTOLIC BLOOD PRESSURE: 68 MMHG

## 2022-01-04 DIAGNOSIS — J45.21 MILD INTERMITTENT ASTHMA WITH EXACERBATION: ICD-10-CM

## 2022-01-04 DIAGNOSIS — K21.9 GASTROESOPHAGEAL REFLUX DISEASE, UNSPECIFIED WHETHER ESOPHAGITIS PRESENT: Primary | ICD-10-CM

## 2022-01-04 DIAGNOSIS — L30.4 INTERTRIGO: ICD-10-CM

## 2022-01-04 DIAGNOSIS — J30.2 SEASONAL ALLERGIES: ICD-10-CM

## 2022-01-04 DIAGNOSIS — F51.02 ADJUSTMENT INSOMNIA: ICD-10-CM

## 2022-01-04 PROCEDURE — 99214 OFFICE O/P EST MOD 30 MIN: CPT | Performed by: STUDENT IN AN ORGANIZED HEALTH CARE EDUCATION/TRAINING PROGRAM

## 2022-01-04 RX ORDER — OMEPRAZOLE 40 MG/1
40 CAPSULE, DELAYED RELEASE ORAL DAILY
Qty: 90 CAPSULE | Refills: 1 | Status: SHIPPED | OUTPATIENT
Start: 2022-01-04

## 2022-01-04 RX ORDER — ALBUTEROL SULFATE 90 UG/1
2 AEROSOL, METERED RESPIRATORY (INHALATION) EVERY 4 HOURS PRN
Qty: 18 G | Refills: 2 | Status: SHIPPED | OUTPATIENT
Start: 2022-01-04

## 2022-01-04 RX ORDER — ALBUTEROL SULFATE 2.5 MG/3ML
2.5 SOLUTION RESPIRATORY (INHALATION) EVERY 4 HOURS PRN
Qty: 30 EACH | Refills: 5 | Status: SHIPPED | OUTPATIENT
Start: 2022-01-04

## 2022-01-04 RX ORDER — NYSTATIN 100000 [USP'U]/G
POWDER TOPICAL 3 TIMES DAILY
Qty: 45 G | Refills: 1 | Status: SHIPPED | OUTPATIENT
Start: 2022-01-04 | End: 2022-04-11 | Stop reason: SDUPTHER

## 2022-01-04 NOTE — PROGRESS NOTES
"Subjective:  Jackelyn Lopez is a 31 y.o. female who presents for      Smoking cessation; has not had a cigarette in several days, has since stopped nicotine patches. States that asthma has been much improved with smoking cessation, denied any night time awakenings, exercise intolerance.    Bariatric surgery;  is currently undergoing evaluation for bariatric surgery and supervised diet.     Insomnia;  has only been getting 2-3 hours of sleep per night, has not tried any medication for it, believes it is because of smoking cessation. Main issues is with falling asleep, usually stays asleep.  tries to get into bed around 10 every night, does not have any screen time in bed, but admits to not being tired prior to laying down. After a hour will try to get up and do something until she feels tired.     Patient Active Problem List   Diagnosis   • Class 3 severe obesity due to excess calories with serious comorbidity and body mass index (BMI) of 45.0 to 49.9 in adult ([facility])   • Asthma   • Cyst of ovary   • Genital herpes simplex   • Pregnancy   • Sickle cell trait ([facility])     Vitals:    Vitals:    01/04/22 1517   BP: 118/68   BP Location: Right arm   Patient Position: Sitting   Cuff Size: Large Adult   Pulse: 98   Temp: 98.9 °F (37.2 °C)   SpO2: 99%   Weight: 120 kg (264 lb 1.6 oz)   Height: 160 cm (63\")     Body mass index is 46.78 kg/m².      Current Outpatient Medications:   •  albuterol (PROVENTIL) (2.5 MG/3ML) 0.083% nebulizer solution, Take 2.5 mg by nebulization Every 4 (Four) Hours As Needed for Wheezing., Disp: 30 each, Rfl: 5  •  albuterol sulfate  (90 Base) MCG/ACT inhaler, Inhale 2 puffs Every 4 (Four) Hours As Needed for Wheezing., Disp: 18 g, Rfl: 2  •  Gaby 0.35 MG tablet, TAKE ONE TABLET BY MOUTH DAILY, Disp: 84 tablet, Rfl: 0  •  ibuprofen (ADVIL,MOTRIN) 800 MG tablet, Take 1 tablet by mouth Every 8 (Eight) Hours **Take with food**, Disp: 60 tablet, Rfl: 2  •  methocarbamol " (Robaxin) 500 MG tablet, Take 1 tablet by mouth 4 (Four) Times a Day., Disp: 90 tablet, Rfl: 1  •  montelukast (Singulair) 10 MG tablet, Take 1 tablet by mouth Every Night., Disp: 90 tablet, Rfl: 1  •  nystatin (MYCOSTATIN) 418814 UNIT/GM powder, Apply  topically to the appropriate area as directed 3 (Three) Times a Day., Disp: 45 g, Rfl: 1  •  omeprazole (PrilOSEC) 40 MG capsule, Take 1 capsule by mouth Daily., Disp: 90 capsule, Rfl: 1    Patient Active Problem List   Diagnosis   • Class 3 severe obesity due to excess calories with serious comorbidity and body mass index (BMI) of 45.0 to 49.9 in adult (HCC)   • Asthma   • Cyst of ovary   • Genital herpes simplex   • Pregnancy   • Sickle cell trait (HCC)     Past Surgical History:   Procedure Laterality Date   •  SECTION N/A 2020    Procedure:  SECTION PRIMARY;  Surgeon: Nikolai Mtz DO;  Location: [facility] LABOR DELIVERY;  Service: Obstetrics;  Laterality: N/A;   •  SECTION  2020   • CHOLECYSTECTOMY  2012    lap     Social History     Socioeconomic History   • Marital status: Single   Tobacco Use   • Smoking status: Former Smoker     Packs/day: 0.25     Years: 15.00     Pack years: 3.75     Types: Cigarettes     Quit date: 2021     Years since quittin.0   • Smokeless tobacco: Never Used   Vaping Use   • Vaping Use: Never used   Substance and Sexual Activity   • Alcohol use: Not Currently     Alcohol/week: 0.0 standard drinks   • Drug use: Not Currently     Types: Marijuana   • Sexual activity: Yes     Partners: Male     Birth control/protection: Other     Family History   Problem Relation Age of Onset   • Hypertension Mother    • Diabetes Mother    • Stroke Mother    • Obesity Mother    • COPD Maternal Grandmother    • Hypertension Maternal Grandmother      Office Visit on 2021   Component Date Value Ref Range Status   • Chlamydia DNA by PCR 2021 Negative  Negative Final   • Neisseria gonorrhoeae by  PCR 11/11/2021 Negative  Negative Final   • Trichomonas vaginalis PCR 11/11/2021 Negative  Negative, Invalid Final   Office Visit on 10/11/2021   Component Date Value Ref Range Status   • Chlamydia DNA by PCR 10/11/2021 Negative  Negative Final   • Neisseria gonorrhoeae by PCR 10/11/2021 Positive* Negative Final   • Trichomonas vaginalis PCR 10/11/2021 Negative  Negative, Invalid Final   • NATALIE ALBICANS 10/11/2021 Negative  Negative Final   • GARDNERELLA VAGINALIS 10/11/2021 Positive* Negative Final   • TRICHOMONAS VAGINALIS 10/11/2021 Negative  Negative Final   • Case Report 10/11/2021    Final                    Value:Gynecologic Cytology Report                       Case: [id number]                                  Authorizing Provider:  Myrtle Still APRN   Collected:           10/11/2021 01:42 PM          Ordering Location:     [facility]   Received:            10/11/2021 03:48 PM                                 [facility]                                                         First Screen:          Virginie Ross                                                               Specimen:    Sendout to [facility], Cervix                                                                    • Interpretation 10/11/2021 See attached report   Final      MRI Lumbar Spine Without Contrast  Narrative: EXAM: MRI LUMBAR SPINE WO CONTRAST     CLINICAL INDICATION: Low back pain    COMPARISON: 8/13/2021    TECHNIQUE: The MRI was done using sagittal and axial T1 and  T2-weighted images and sagittal STIR images.    FINDINGS: The vertebral body alignment and marrow signal are  normal. There is no marrow replacing bone lesion, bone marrow  edema or compression deformity. The conus medullaris and cauda  equina have unremarkable morphology and characteristics.    The intervertebral discs appear normal with no significant disc  bulge, disc extrusion, central canal stenosis, or neural foramen  narrowing at any  level.  Impression: Normal MRI of the lumbar spine.          Electronically signed by:  Arturo Callahan MD  10/9/2021 4:44 PM CDT  Workstation: [id number]      [unfilled]  Immunization History   Administered Date(s) Administered   • Tdap 09/19/2018, 10/05/2020     The following portions of the patient's history were reviewed and updated as appropriate: allergies, current medications, past family history, past medical history, past social history, past surgical history and problem list.    PHQ-9 Total Score:           Physical Exam  Constitutional:       Appearance: Normal appearance.   HENT:      Head: Normocephalic and atraumatic.      Right Ear: External ear normal.      Left Ear: External ear normal.   Eyes:      General:         Right eye: No discharge.         Left eye: No discharge.      Conjunctiva/sclera: Conjunctivae normal.   Cardiovascular:      Rate and Rhythm: Normal rate and regular rhythm.      Pulses: Normal pulses.      Heart sounds: Normal heart sounds. No murmur heard.      Pulmonary:      Effort: Pulmonary effort is normal. No respiratory distress.      Breath sounds: Normal breath sounds.   Abdominal:      General: There is no distension.      Palpations: Abdomen is soft.      Tenderness: There is no abdominal tenderness.   Musculoskeletal:      Cervical back: Normal range of motion.      Right lower leg: No edema.      Left lower leg: No edema.   Lymphadenopathy:      Cervical: No cervical adenopathy.   Neurological:      Mental Status: She is alert. Mental status is at baseline.   Psychiatric:         Mood and Affect: Mood normal.         Behavior: Behavior normal.       Assessment/Plan    Diagnosis Plan   1. Gastroesophageal reflux disease, unspecified whether esophagitis present  omeprazole (PrilOSEC) 40 MG capsule   2. Seasonal allergies  albuterol (PROVENTIL) (2.5 MG/3ML) 0.083% nebulizer solution   3. Mild intermittent asthma with exacerbation  albuterol sulfate  (90 Base) MCG/ACT  inhaler   4. Intertrigo  nystatin (MYCOSTATIN) 270296 UNIT/GM powder   5. Adjustment insomnia        No orders of the defined types were placed in this encounter.    Insomnia; counseled on importance of sleep hygiene, melatonin, hydroxyzine or Unisom as needed.  Follow-up in 3 months, sooner if uncontrolled.  Consider trazodone versus Ambien at that time.    Medication refills as above, provide good relief, no adverse effects, will refill.    Intertrigo; states with weight loss, has had itchiness, redness in lower abdomen, exam reassuring, no visible rash, will trial nystatin powder as above.          This document has been electronically signed by Bull Greene MD on January 4, 2022 15:50 CST    EMR Dragon/Transciption Disclaimer: Some of this note may be an electronic transcription/translation of spoken language to printed text.  The electronic translation of spoken language may permit erroneous, or at times, nonsensical words or phrases to be inadvertently transcribed. Although I have reviewed the note for such errors, some may still exist.

## 2022-01-24 ENCOUNTER — TELEPHONE (OUTPATIENT)
Dept: BARIATRICS/WEIGHT MGMT | Facility: CLINIC | Age: 32
End: 2022-01-24

## 2022-01-24 NOTE — TELEPHONE ENCOUNTER
I called patient and informed her that 3rd visits have to be done in office because there are consents that have to be signed for EGDs. Patient understood and will call back once children are no longer sick so we can get her rescheduled.    ----- Message from Jackelyn Lopez sent at 1/23/2022  8:15 AM CST -----  Regarding: Visit on 1/25/22  Good Morning     Is it possible for me to have a telehealth visit on 1/25/22? I do have a scale and blood pressure cuff at home. My children are sick and I don’t have anyone to keep them to come to my appointment.

## 2022-02-07 RX ORDER — NORETHINDRONE 0.35 MG/1
TABLET ORAL
Qty: 84 TABLET | Refills: 0 | Status: SHIPPED | OUTPATIENT
Start: 2022-02-07 | End: 2022-05-31

## 2022-02-14 ENCOUNTER — TELEPHONE (OUTPATIENT)
Dept: BARIATRICS/WEIGHT MGMT | Facility: CLINIC | Age: 32
End: 2022-02-14

## 2022-02-14 NOTE — TELEPHONE ENCOUNTER
Patient called in and stated that she is not interested in having surgery at this time and that she is following the meal plan and is going to do this on her own.

## 2022-04-11 ENCOUNTER — OFFICE VISIT (OUTPATIENT)
Dept: FAMILY MEDICINE CLINIC | Facility: CLINIC | Age: 32
End: 2022-04-11

## 2022-04-11 VITALS
DIASTOLIC BLOOD PRESSURE: 74 MMHG | HEART RATE: 99 BPM | HEIGHT: 63 IN | BODY MASS INDEX: 44.99 KG/M2 | TEMPERATURE: 97.9 F | WEIGHT: 253.9 LBS | SYSTOLIC BLOOD PRESSURE: 126 MMHG | OXYGEN SATURATION: 100 %

## 2022-04-11 DIAGNOSIS — J30.2 SEASONAL ALLERGIES: ICD-10-CM

## 2022-04-11 DIAGNOSIS — Z00.00 ANNUAL PHYSICAL EXAM: Primary | ICD-10-CM

## 2022-04-11 DIAGNOSIS — L30.4 INTERTRIGO: ICD-10-CM

## 2022-04-11 DIAGNOSIS — E66.01 CLASS 3 SEVERE OBESITY WITHOUT SERIOUS COMORBIDITY WITH BODY MASS INDEX (BMI) OF 45.0 TO 49.9 IN ADULT, UNSPECIFIED OBESITY TYPE: ICD-10-CM

## 2022-04-11 DIAGNOSIS — Z13.220 LIPID SCREENING: ICD-10-CM

## 2022-04-11 PROBLEM — J45.909 ASTHMA: Status: ACTIVE | Noted: 2022-01-31

## 2022-04-11 PROCEDURE — 2014F MENTAL STATUS ASSESS: CPT | Performed by: STUDENT IN AN ORGANIZED HEALTH CARE EDUCATION/TRAINING PROGRAM

## 2022-04-11 PROCEDURE — 3008F BODY MASS INDEX DOCD: CPT | Performed by: STUDENT IN AN ORGANIZED HEALTH CARE EDUCATION/TRAINING PROGRAM

## 2022-04-11 PROCEDURE — 99395 PREV VISIT EST AGE 18-39: CPT | Performed by: STUDENT IN AN ORGANIZED HEALTH CARE EDUCATION/TRAINING PROGRAM

## 2022-04-11 RX ORDER — NAPROXEN 500 MG/1
TABLET ORAL
COMMUNITY
Start: 2022-01-31

## 2022-04-11 RX ORDER — NYSTATIN 100000 [USP'U]/G
POWDER TOPICAL 3 TIMES DAILY
Qty: 45 G | Refills: 1 | Status: SHIPPED | OUTPATIENT
Start: 2022-04-11

## 2022-04-11 RX ORDER — CYCLOBENZAPRINE HCL 10 MG
TABLET ORAL
COMMUNITY

## 2022-04-11 RX ORDER — MONTELUKAST SODIUM 10 MG/1
10 TABLET ORAL NIGHTLY
Qty: 90 TABLET | Refills: 1 | Status: SHIPPED | OUTPATIENT
Start: 2022-04-11

## 2022-05-23 NOTE — PROGRESS NOTES
Subjective   Chief Complaint   Patient presents with   • Heartburn     3 mo f/u    • Sore Throat          • Headache     Jackelyn Lopez is a 31 y.o. year old  presenting to be seen for her annual exam.      Concerns: none    She is sexually active.  In the past 12 months there has not been new sexual partners.  Condoms ARE typically used.  She would not like to be screened for STD's at today's exam.   She is using condoms for contraception.      She exercises regularly: no.  Healthy Diet:yes.  She wears her seat belt:yes.  She has concerns about domestic violence: no.  She is taking Vit D and Calcium:no  Last colonoscopy or FIT test: NA  Last DEXA: NA  Last PAP: UTD  Last Mammo: NA  Immunization status: up to date and documented.      LMP: 2 weeks ago  Periods Regular: yes.    OB History        9    Para   7    Term   6       1    AB   2    Living   7       SAB   2    IAB        Ectopic        Molar        Multiple   0    Live Births   7                No Additional Complaints Reported    The following portions of the patient's history were reviewed and updated as appropriate:problem list, current medications, allergies, past family history, past medical history, past social history and past surgical history.    Social History    Tobacco Use      Smoking status: Former Smoker        Packs/day: 0.25        Years: 15.00        Pack years: 3.75        Types: Cigarettes        Quit date: 2021        Years since quittin.3      Smokeless tobacco: Never Used    Review of Systems   Constitutional: Negative for appetite change and fever.   HENT: Negative for sore throat.    Eyes: Negative for discharge and visual disturbance.   Respiratory: Negative for cough and shortness of breath.    Cardiovascular: Negative for chest pain, palpitations and leg swelling.   Gastrointestinal: Negative for abdominal pain, diarrhea and vomiting.   Genitourinary: Negative for dysuria.   Skin: Negative  "for rash.   Neurological: Negative for light-headedness and headaches.   Psychiatric/Behavioral: Negative for agitation.         Objective   /74   Pulse 99   Temp 97.9 °F (36.6 °C) (Infrared)   Ht 160 cm (63\")   Wt 115 kg (253 lb 14.4 oz)   SpO2 100%   BMI 44.98 kg/m²     General:  well developed; well nourished  no acute distress   Skin:  No suspicious lesions seen   Thyroid: normal to inspection and palpation   Breasts:  Not performed.   Abdomen: soft, non-tender; no masses  no umbilical or inguinal hernias are present  no hepato-splenomegaly   Psych: alert,oriented, in NAD with a full range of affect, normal behavior and no psychotic features   Pelvis: Not performed.     Lab Review   No data reviewed    Imaging  No data reviewed       Assessment   1. Annual wellness     Plan   1. Annual wellness exam, continue to encourage diet, exercise, weight loss.  No red flags on exam, needs lab screening as above, declined STI screening.  Follow-up in 3 months or sooner if needed.    New Medications Ordered This Visit   Medications   • nystatin (MYCOSTATIN) [id number] UNIT/GM powder     Sig: Apply  topically to the appropriate area as directed 3 (Three) Times a Day.     Dispense:  45 g     Refill:  1   • montelukast (Singulair) 10 MG tablet     Sig: Take 1 tablet by mouth Every Night.     Dispense:  90 tablet     Refill:  1          This note was electronically signed.    Bull Greene MD  May 22, 2022    "

## 2022-05-31 RX ORDER — NORETHINDRONE 0.35 MG/1
TABLET ORAL
Qty: 84 TABLET | Refills: 0 | Status: SHIPPED | OUTPATIENT
Start: 2022-05-31

## 2022-09-01 ENCOUNTER — WORKER'S COMP (OUTPATIENT)
Dept: OCCUPATIONAL MEDICINE | Age: 32
End: 2022-09-01

## 2022-09-01 ENCOUNTER — TELEPHONE (OUTPATIENT)
Dept: OCCUPATIONAL MEDICINE | Age: 32
End: 2022-09-01

## 2022-09-01 ENCOUNTER — HOSPITAL ENCOUNTER (OUTPATIENT)
Dept: GENERAL RADIOLOGY | Age: 32
Discharge: HOME | End: 2022-09-01
Attending: STUDENT IN AN ORGANIZED HEALTH CARE EDUCATION/TRAINING PROGRAM

## 2022-09-01 VITALS
HEART RATE: 104 BPM | WEIGHT: 266 LBS | BODY MASS INDEX: 47.13 KG/M2 | SYSTOLIC BLOOD PRESSURE: 122 MMHG | HEIGHT: 63 IN | DIASTOLIC BLOOD PRESSURE: 78 MMHG

## 2022-09-01 DIAGNOSIS — S90.32XA CONTUSION OF LEFT FOOT, INITIAL ENCOUNTER: ICD-10-CM

## 2022-09-01 DIAGNOSIS — S90.32XA CONTUSION OF LEFT FOOT, INITIAL ENCOUNTER: Primary | ICD-10-CM

## 2022-09-01 DIAGNOSIS — Z02.6 ENCOUNTER RELATED TO WORKER'S COMPENSATION CLAIM: ICD-10-CM

## 2022-09-01 PROCEDURE — 99204 OFFICE O/P NEW MOD 45 MIN: CPT | Performed by: STUDENT IN AN ORGANIZED HEALTH CARE EDUCATION/TRAINING PROGRAM

## 2022-09-01 PROCEDURE — 73630 X-RAY EXAM OF FOOT: CPT

## 2022-09-01 RX ORDER — ALBUTEROL SULFATE 90 UG/1
90-180 AEROSOL, METERED RESPIRATORY (INHALATION) EVERY 4 HOURS PRN
COMMUNITY
Start: 2022-08-02 | End: 2023-03-14 | Stop reason: SDUPTHER

## 2022-09-01 ASSESSMENT — PAIN SCALES - GENERAL: PAINLEVEL: 10

## 2022-10-06 ENCOUNTER — CASE MANAGEMENT (OUTPATIENT)
Dept: OCCUPATIONAL MEDICINE | Age: 32
End: 2022-10-06

## 2022-10-14 ENCOUNTER — CASE MANAGEMENT (OUTPATIENT)
Dept: OCCUPATIONAL MEDICINE | Age: 32
End: 2022-10-14

## 2022-11-08 ENCOUNTER — TELEPHONE (OUTPATIENT)
Dept: FAMILY MEDICINE | Age: 32
End: 2022-11-08

## 2022-12-16 ENCOUNTER — APPOINTMENT (OUTPATIENT)
Dept: FAMILY MEDICINE | Age: 32
End: 2022-12-16

## 2023-01-26 ENCOUNTER — APPOINTMENT (OUTPATIENT)
Dept: FAMILY MEDICINE | Age: 33
End: 2023-01-26

## 2023-02-06 ENCOUNTER — TELEPHONE (OUTPATIENT)
Dept: FAMILY MEDICINE | Age: 33
End: 2023-02-06

## 2023-02-07 RX ORDER — ACETAMINOPHEN AND CODEINE PHOSPHATE 120; 12 MG/5ML; MG/5ML
1 SOLUTION ORAL DAILY
Qty: 1 PACKET | Refills: 1 | Status: SHIPPED | OUTPATIENT
Start: 2023-02-07 | End: 2023-02-27 | Stop reason: SDUPTHER

## 2023-02-14 ENCOUNTER — OCC HEALTH (OUTPATIENT)
Dept: OCCUPATIONAL MEDICINE | Age: 33
End: 2023-02-14

## 2023-02-14 VITALS
DIASTOLIC BLOOD PRESSURE: 98 MMHG | HEIGHT: 63 IN | BODY MASS INDEX: 50.32 KG/M2 | WEIGHT: 284 LBS | SYSTOLIC BLOOD PRESSURE: 118 MMHG | HEART RATE: 81 BPM

## 2023-02-14 DIAGNOSIS — Z11.1 SCREENING FOR TUBERCULOSIS: ICD-10-CM

## 2023-02-14 DIAGNOSIS — Z02.89 ENCOUNTER FOR OCCUPATIONAL HEALTH EXAMINATION: ICD-10-CM

## 2023-02-14 DIAGNOSIS — Z02.89 VISIT FOR OCCUPATIONAL HEALTH EXAMINATION: Primary | ICD-10-CM

## 2023-02-14 PROCEDURE — 86580 TB INTRADERMAL TEST: CPT | Performed by: NURSE PRACTITIONER

## 2023-02-14 PROCEDURE — OH021 PRE PLACEMENT PHYSICAL EXAM: Performed by: NURSE PRACTITIONER

## 2023-02-16 ENCOUNTER — OCC HEALTH (OUTPATIENT)
Dept: OCCUPATIONAL MEDICINE | Age: 33
End: 2023-02-16

## 2023-02-16 DIAGNOSIS — Z11.1 SCREENING FOR TUBERCULOSIS: Primary | ICD-10-CM

## 2023-02-16 LAB
INDURATION: NORMAL MM (ref 0–?)
SKIN TEST RESULT: NEGATIVE

## 2023-02-21 ENCOUNTER — OFFICE VISIT (OUTPATIENT)
Dept: CHIROPRACTIC MEDICINE | Age: 33
End: 2023-02-21

## 2023-02-21 DIAGNOSIS — M99.02 THORACIC REGION SOMATIC DYSFUNCTION: ICD-10-CM

## 2023-02-21 DIAGNOSIS — M99.01 CERVICAL SOMATIC DYSFUNCTION: ICD-10-CM

## 2023-02-21 DIAGNOSIS — M99.05 PELVIC SOMATIC DYSFUNCTION: ICD-10-CM

## 2023-02-21 DIAGNOSIS — M99.03 LUMBAR REGION SOMATIC DYSFUNCTION: Primary | ICD-10-CM

## 2023-02-21 PROCEDURE — 99203 OFFICE O/P NEW LOW 30 MIN: CPT | Performed by: CHIROPRACTOR

## 2023-02-21 PROCEDURE — 98941 CHIROPRACT MANJ 3-4 REGIONS: CPT | Performed by: CHIROPRACTOR

## 2023-02-27 ENCOUNTER — OFFICE VISIT (OUTPATIENT)
Dept: FAMILY MEDICINE | Age: 33
End: 2023-02-27

## 2023-02-27 VITALS
BODY MASS INDEX: 51.21 KG/M2 | HEART RATE: 84 BPM | WEIGHT: 289 LBS | SYSTOLIC BLOOD PRESSURE: 130 MMHG | DIASTOLIC BLOOD PRESSURE: 88 MMHG | HEIGHT: 63 IN

## 2023-02-27 DIAGNOSIS — Z71.6 ENCOUNTER FOR SMOKING CESSATION COUNSELING: ICD-10-CM

## 2023-02-27 DIAGNOSIS — J45.20 MILD INTERMITTENT ASTHMA WITHOUT COMPLICATION: ICD-10-CM

## 2023-02-27 DIAGNOSIS — M54.50 CHRONIC MIDLINE LOW BACK PAIN WITHOUT SCIATICA: ICD-10-CM

## 2023-02-27 DIAGNOSIS — Z00.00 WELLNESS EXAMINATION: Primary | ICD-10-CM

## 2023-02-27 DIAGNOSIS — Z30.011 BCP (BIRTH CONTROL PILLS) INITIATION: ICD-10-CM

## 2023-02-27 DIAGNOSIS — E66.01 MORBID OBESITY WITH BMI OF 50.0-59.9, ADULT (CMD): ICD-10-CM

## 2023-02-27 DIAGNOSIS — G89.29 CHRONIC MIDLINE LOW BACK PAIN WITHOUT SCIATICA: ICD-10-CM

## 2023-02-27 PROCEDURE — 99385 PREV VISIT NEW AGE 18-39: CPT | Performed by: FAMILY MEDICINE

## 2023-02-27 RX ORDER — CYCLOBENZAPRINE HCL 10 MG
TABLET ORAL
COMMUNITY
End: 2023-02-27 | Stop reason: SDUPTHER

## 2023-02-27 RX ORDER — ACETAMINOPHEN AND CODEINE PHOSPHATE 120; 12 MG/5ML; MG/5ML
1 SOLUTION ORAL DAILY
Qty: 1 PACKET | Refills: 1 | Status: SHIPPED | OUTPATIENT
Start: 2023-02-27 | End: 2023-04-05

## 2023-02-27 RX ORDER — BUPROPION HYDROCHLORIDE 100 MG/1
100 TABLET, EXTENDED RELEASE ORAL 2 TIMES DAILY
Qty: 60 TABLET | Refills: 1 | Status: SHIPPED | OUTPATIENT
Start: 2023-02-27

## 2023-02-27 RX ORDER — CYCLOBENZAPRINE HCL 10 MG
TABLET ORAL
Qty: 30 TABLET | Refills: 1 | Status: SHIPPED | OUTPATIENT
Start: 2023-02-27

## 2023-02-27 ASSESSMENT — ANXIETY QUESTIONNAIRES
6. BECOMING EASILY ANNOYED OR IRRITABLE: 1
1. FEELING NERVOUS, ANXIOUS, OR ON EDGE: 1
4. TROUBLE RELAXING: 3
7. FEELING AFRAID AS IF SOMETHING AWFUL MIGHT HAPPEN: 0
GAD7 TOTAL SCORE: 6
3. WORRYING TOO MUCH ABOUT DIFFERENT THINGS: SEVERAL DAYS
7. FEELING AFRAID AS IF SOMETHING AWFUL MIGHT HAPPEN: NOT AT ALL
6. BECOMING EASILY ANNOYED OR IRRITABLE: SEVERAL DAYS
4. TROUBLE RELAXING: NEARLY EVERY DAY
1. FEELING NERVOUS, ANXIOUS, OR ON EDGE: SEVERAL DAYS
2. NOT BEING ABLE TO STOP OR CONTROL WORRYING: 0
3. WORRYING TOO MUCH ABOUT DIFFERENT THINGS: 1
5. BEING SO RESTLESS THAT IT IS HARD TO SIT STILL: NOT AT ALL
2. NOT BEING ABLE TO STOP OR CONTROL WORRYING: NOT AT ALL
5. BEING SO RESTLESS THAT IT IS HARD TO SIT STILL: 0

## 2023-02-27 ASSESSMENT — PATIENT HEALTH QUESTIONNAIRE - PHQ9
3. TROUBLE FALLING OR STAYING ASLEEP OR SLEEPING TOO MUCH: NEARLY EVERY DAY
SUM OF ALL RESPONSES TO PHQ9 QUESTIONS 1 AND 2: 1
7. TROUBLE CONCENTRATING ON THINGS, SUCH AS READING THE NEWSPAPER OR WATCHING TELEVISION: SEVERAL DAYS
9. THOUGHTS THAT YOU WOULD BE BETTER OFF DEAD, OR OF HURTING YOURSELF: NOT AT ALL
6. FEELING BAD ABOUT YOURSELF - OR THAT YOU ARE A FAILURE OR HAVE LET YOURSELF OR YOUR FAMILY DOWN: NOT AT ALL
10. IF YOU CHECKED OFF ANY PROBLEMS, HOW DIFFICULT HAVE THESE PROBLEMS MADE IT FOR YOU TO DO YOUR WORK, TAKE CARE OF THINGS AT HOME, OR GET ALONG WITH OTHER PEOPLE: SOMEWHAT DIFFICULT
SUM OF ALL RESPONSES TO PHQ9 QUESTIONS 1 AND 2: 1
8. MOVING OR SPEAKING SO SLOWLY THAT OTHER PEOPLE COULD HAVE NOTICED. OR THE OPPOSITE, BEING SO FIGETY OR RESTLESS THAT YOU HAVE BEEN MOVING AROUND A LOT MORE THAN USUAL: NOT AT ALL
2. FEELING DOWN, DEPRESSED OR HOPELESS: NOT AT ALL
1. LITTLE INTEREST OR PLEASURE IN DOING THINGS: SEVERAL DAYS
SUM OF ALL RESPONSES TO PHQ QUESTIONS 1-9: 11
CLINICAL INTERPRETATION OF PHQ2 SCORE: NO FURTHER SCREENING NEEDED
4. FEELING TIRED OR HAVING LITTLE ENERGY: NEARLY EVERY DAY
CLINICAL INTERPRETATION OF PHQ9 SCORE: MODERATE DEPRESSION
5. POOR APPETITE, WEIGHT LOSS, OR OVEREATING: NEARLY EVERY DAY

## 2023-02-28 ENCOUNTER — TELEPHONE (OUTPATIENT)
Dept: ENDOCRINOLOGY | Age: 33
End: 2023-02-28

## 2023-02-28 DIAGNOSIS — E66.01 CLASS 3 SEVERE OBESITY DUE TO EXCESS CALORIES WITH BODY MASS INDEX (BMI) OF 50.0 TO 59.9 IN ADULT, UNSPECIFIED WHETHER SERIOUS COMORBIDITY PRESENT (CMD): Primary | ICD-10-CM

## 2023-03-01 ENCOUNTER — APPOINTMENT (OUTPATIENT)
Dept: CHIROPRACTIC MEDICINE | Age: 33
End: 2023-03-01

## 2023-03-02 ENCOUNTER — APPOINTMENT (OUTPATIENT)
Dept: CHIROPRACTIC MEDICINE | Age: 33
End: 2023-03-02

## 2023-03-02 ENCOUNTER — LAB SERVICES (OUTPATIENT)
Dept: LAB | Age: 33
End: 2023-03-02

## 2023-03-02 DIAGNOSIS — E66.01 CLASS 3 SEVERE OBESITY DUE TO EXCESS CALORIES WITH BODY MASS INDEX (BMI) OF 50.0 TO 59.9 IN ADULT, UNSPECIFIED WHETHER SERIOUS COMORBIDITY PRESENT (CMD): ICD-10-CM

## 2023-03-02 DIAGNOSIS — E66.01 MORBID OBESITY WITH BMI OF 50.0-59.9, ADULT (CMD): ICD-10-CM

## 2023-03-02 LAB
25(OH)D3+25(OH)D2 SERPL-MCNC: 11.2 NG/ML (ref 30–100)
ALBUMIN SERPL-MCNC: 3.3 G/DL (ref 3.6–5.1)
ALBUMIN/GLOB SERPL: 0.9 {RATIO} (ref 1–2.4)
ALP SERPL-CCNC: 101 UNITS/L (ref 45–117)
ALT SERPL-CCNC: 39 UNITS/L
ANION GAP SERPL CALC-SCNC: 12 MMOL/L (ref 7–19)
AST SERPL-CCNC: 28 UNITS/L
BASOPHILS # BLD: 0 K/MCL (ref 0–0.3)
BASOPHILS NFR BLD: 0 %
BILIRUB SERPL-MCNC: 0.2 MG/DL (ref 0.2–1)
BUN SERPL-MCNC: 9 MG/DL (ref 6–20)
BUN/CREAT SERPL: 13 (ref 7–25)
CALCIUM SERPL-MCNC: 8.8 MG/DL (ref 8.4–10.2)
CHLORIDE SERPL-SCNC: 104 MMOL/L (ref 97–110)
CHOLEST SERPL-MCNC: 127 MG/DL
CHOLEST/HDLC SERPL: 2.3 {RATIO}
CO2 SERPL-SCNC: 26 MMOL/L (ref 21–32)
CREAT SERPL-MCNC: 0.7 MG/DL (ref 0.51–0.95)
DEPRECATED RDW RBC: 41.9 FL (ref 39–50)
EOSINOPHIL # BLD: 0.2 K/MCL (ref 0–0.5)
EOSINOPHIL NFR BLD: 4 %
ERYTHROCYTE [DISTWIDTH] IN BLOOD: 14.9 % (ref 11–15)
FASTING DURATION TIME PATIENT: 15 HOURS (ref 0–999)
FASTING DURATION TIME PATIENT: 15 HOURS (ref 0–999)
FASTING DURATION TIME PATIENT: NORMAL H
GFR SERPLBLD BASED ON 1.73 SQ M-ARVRAT: >90 ML/MIN
GLOBULIN SER-MCNC: 3.5 G/DL (ref 2–4)
GLUCOSE SERPL-MCNC: 84 MG/DL (ref 70–99)
HBA1C MFR BLD: 5.1 % (ref 4.5–5.6)
HCT VFR BLD CALC: 33.4 % (ref 36–46.5)
HDLC SERPL-MCNC: 56 MG/DL
HGB BLD-MCNC: 11.6 G/DL (ref 12–15.5)
IMM GRANULOCYTES # BLD AUTO: 0 K/MCL (ref 0–0.2)
IMM GRANULOCYTES # BLD: 0 %
INSULIN P FAST SERPL-ACNC: 15 MUNITS/L (ref 3–28)
LDLC SERPL CALC-MCNC: 64 MG/DL
LYMPHOCYTES # BLD: 1.3 K/MCL (ref 1–4.8)
LYMPHOCYTES NFR BLD: 24 %
MCH RBC QN AUTO: 26.5 PG (ref 26–34)
MCHC RBC AUTO-ENTMCNC: 34.7 G/DL (ref 32–36.5)
MCV RBC AUTO: 76.3 FL (ref 78–100)
MONOCYTES # BLD: 0.6 K/MCL (ref 0.3–0.9)
MONOCYTES NFR BLD: 11 %
NEUTROPHILS # BLD: 3.4 K/MCL (ref 1.8–7.7)
NEUTROPHILS NFR BLD: 61 %
NONHDLC SERPL-MCNC: 71 MG/DL
PLATELET # BLD AUTO: 196 K/MCL (ref 140–450)
POTASSIUM SERPL-SCNC: 3.9 MMOL/L (ref 3.4–5.1)
PROT SERPL-MCNC: 6.8 G/DL (ref 6.4–8.2)
RBC # BLD: 4.38 MIL/MCL (ref 4–5.2)
SODIUM SERPL-SCNC: 138 MMOL/L (ref 135–145)
T3 SERPL-MCNC: 0.98 NG/ML (ref 0.6–1.81)
T4 FREE SERPL-MCNC: 0.9 NG/DL (ref 0.8–1.5)
TRIGL SERPL-MCNC: 35 MG/DL
TSH SERPL-ACNC: 1.13 MCUNITS/ML (ref 0.35–5)
URATE SERPL-MCNC: 4.3 MG/DL (ref 2.6–5.9)
WBC # BLD: 5.5 K/MCL (ref 4.2–11)

## 2023-03-02 PROCEDURE — 99000 SPECIMEN HANDLING OFFICE-LAB: CPT | Performed by: INTERNAL MEDICINE

## 2023-03-02 PROCEDURE — 84550 ASSAY OF BLOOD/URIC ACID: CPT | Performed by: CLINICAL MEDICAL LABORATORY

## 2023-03-02 PROCEDURE — 83036 HEMOGLOBIN GLYCOSYLATED A1C: CPT | Performed by: CLINICAL MEDICAL LABORATORY

## 2023-03-02 PROCEDURE — 84480 ASSAY TRIIODOTHYRONINE (T3): CPT | Performed by: CLINICAL MEDICAL LABORATORY

## 2023-03-02 PROCEDURE — 82306 VITAMIN D 25 HYDROXY: CPT | Performed by: CLINICAL MEDICAL LABORATORY

## 2023-03-02 PROCEDURE — 84439 ASSAY OF FREE THYROXINE: CPT | Performed by: CLINICAL MEDICAL LABORATORY

## 2023-03-02 PROCEDURE — 80061 LIPID PANEL: CPT | Performed by: CLINICAL MEDICAL LABORATORY

## 2023-03-02 PROCEDURE — 83525 ASSAY OF INSULIN: CPT | Performed by: CLINICAL MEDICAL LABORATORY

## 2023-03-02 PROCEDURE — 80050 GENERAL HEALTH PANEL: CPT | Performed by: INTERNAL MEDICINE

## 2023-03-07 ENCOUNTER — TELEPHONIC VISIT (OUTPATIENT)
Dept: EDUCATION SERVICES | Age: 33
End: 2023-03-07

## 2023-03-14 ENCOUNTER — WALK IN (OUTPATIENT)
Dept: URGENT CARE | Age: 33
End: 2023-03-14

## 2023-03-14 VITALS — HEART RATE: 105 BPM | OXYGEN SATURATION: 100 % | TEMPERATURE: 98.2 F

## 2023-03-14 DIAGNOSIS — J01.41 ACUTE RECURRENT PANSINUSITIS: Primary | ICD-10-CM

## 2023-03-14 DIAGNOSIS — J45.20 MILD INTERMITTENT ASTHMA WITHOUT COMPLICATION: ICD-10-CM

## 2023-03-14 PROCEDURE — 99214 OFFICE O/P EST MOD 30 MIN: CPT | Performed by: FAMILY MEDICINE

## 2023-03-14 RX ORDER — CETIRIZINE HYDROCHLORIDE 10 MG/1
10 TABLET ORAL DAILY
Qty: 30 TABLET | Refills: 0 | Status: SHIPPED | OUTPATIENT
Start: 2023-03-14 | End: 2023-10-24

## 2023-03-14 RX ORDER — AMOXICILLIN 500 MG/1
1000 TABLET, FILM COATED ORAL 2 TIMES DAILY
Qty: 28 TABLET | Refills: 0 | Status: SHIPPED | OUTPATIENT
Start: 2023-03-14 | End: 2023-03-21

## 2023-03-14 RX ORDER — MONTELUKAST SODIUM 10 MG/1
10 TABLET ORAL NIGHTLY
Qty: 30 TABLET | Refills: 0 | Status: SHIPPED | OUTPATIENT
Start: 2023-03-14 | End: 2023-10-24

## 2023-03-14 RX ORDER — ALBUTEROL SULFATE 90 UG/1
2 AEROSOL, METERED RESPIRATORY (INHALATION) EVERY 4 HOURS PRN
Qty: 1 EACH | Refills: 0 | Status: SHIPPED | OUTPATIENT
Start: 2023-03-14 | End: 2024-03-08

## 2023-03-19 ENCOUNTER — E-ADVICE (OUTPATIENT)
Dept: FAMILY MEDICINE | Age: 33
End: 2023-03-19

## 2023-03-20 RX ORDER — NYSTATIN 100000 [USP'U]/G
1 POWDER TOPICAL 3 TIMES DAILY
Qty: 30 G | Refills: 1 | Status: SHIPPED | OUTPATIENT
Start: 2023-03-20

## 2023-04-04 DIAGNOSIS — Z30.011 BCP (BIRTH CONTROL PILLS) INITIATION: ICD-10-CM

## 2023-04-05 RX ORDER — ACETAMINOPHEN AND CODEINE PHOSPHATE 120; 12 MG/5ML; MG/5ML
1 SOLUTION ORAL DAILY
Qty: 84 TABLET | Refills: 3 | Status: SHIPPED | OUTPATIENT
Start: 2023-04-05 | End: 2023-08-29 | Stop reason: ALTCHOICE

## 2023-04-06 ENCOUNTER — APPOINTMENT (OUTPATIENT)
Dept: ENDOCRINOLOGY | Age: 33
End: 2023-04-06
Attending: FAMILY MEDICINE

## 2023-04-12 ENCOUNTER — TELEPHONE (OUTPATIENT)
Dept: ENDOCRINOLOGY | Age: 33
End: 2023-04-12

## 2023-04-12 ENCOUNTER — APPOINTMENT (OUTPATIENT)
Dept: ENDOCRINOLOGY | Age: 33
End: 2023-04-12
Attending: FAMILY MEDICINE

## 2023-04-12 ENCOUNTER — APPOINTMENT (OUTPATIENT)
Dept: ENDOCRINOLOGY | Age: 33
End: 2023-04-12

## 2023-05-17 ENCOUNTER — OFFICE VISIT (OUTPATIENT)
Dept: ENDOCRINOLOGY | Age: 33
End: 2023-05-17
Attending: FAMILY MEDICINE

## 2023-05-17 VITALS
HEIGHT: 63 IN | BODY MASS INDEX: 50.29 KG/M2 | HEART RATE: 96 BPM | SYSTOLIC BLOOD PRESSURE: 110 MMHG | WEIGHT: 283.8 LBS | DIASTOLIC BLOOD PRESSURE: 70 MMHG

## 2023-05-17 DIAGNOSIS — J45.20 MILD INTERMITTENT ASTHMA WITHOUT COMPLICATION: ICD-10-CM

## 2023-05-17 DIAGNOSIS — E88.819 INSULIN RESISTANCE: Primary | ICD-10-CM

## 2023-05-17 DIAGNOSIS — E66.01 CLASS 3 SEVERE OBESITY DUE TO EXCESS CALORIES WITH SERIOUS COMORBIDITY AND BODY MASS INDEX (BMI) OF 50.0 TO 59.9 IN ADULT (CMD): ICD-10-CM

## 2023-05-17 DIAGNOSIS — M54.50 CHRONIC MIDLINE LOW BACK PAIN WITHOUT SCIATICA: ICD-10-CM

## 2023-05-17 DIAGNOSIS — F41.9 ANXIETY AND DEPRESSION: ICD-10-CM

## 2023-05-17 DIAGNOSIS — G89.29 CHRONIC MIDLINE LOW BACK PAIN WITHOUT SCIATICA: ICD-10-CM

## 2023-05-17 DIAGNOSIS — R29.818 SUSPECTED SLEEP APNEA: ICD-10-CM

## 2023-05-17 DIAGNOSIS — E55.9 VITAMIN D DEFICIENCY: ICD-10-CM

## 2023-05-17 DIAGNOSIS — F32.A ANXIETY AND DEPRESSION: ICD-10-CM

## 2023-05-17 PROCEDURE — 99244 OFF/OP CNSLTJ NEW/EST MOD 40: CPT | Performed by: NURSE PRACTITIONER

## 2023-05-17 RX ORDER — ERGOCALCIFEROL 1.25 MG/1
1.25 CAPSULE ORAL
Qty: 12 CAPSULE | Refills: 0 | Status: SHIPPED | OUTPATIENT
Start: 2023-05-22 | End: 2023-08-08

## 2023-05-17 ASSESSMENT — SLEEP AND FATIGUE QUESTIONNAIRES
HOW LIKELY ARE YOU TO NOD OFF OR FALL ASLEEP WHILE SITTING INACTIVE IN A PUBLIC PLACE: 0
HOW LIKELY ARE YOU TO NOD OFF OR FALL ASLEEP WHILE SITTING AND TALKING TO SOMEONE: 0
HOW LIKELY ARE YOU TO NOD OFF OR FALL ASLEEP IN A CAR, WHILE STOPPED FOR A FEW MINUTES IN TRAFFIC: 0
HOW LIKELY ARE YOU TO NOD OFF OR FALL ASLEEP WHEN YOU ARE A PASSENGER IN A CAR FOR AN HOUR WITHOUT A BREAK: 3
HOW LIKELY ARE YOU TO NOD OFF OR FALL ASLEEP WHILE WATCHING TV: 1
ESS TOTAL SCORE: 5
HOW LIKELY ARE YOU TO NOD OFF OR FALL ASLEEP WHILE LYING DOWN TO REST IN THE AFTERNOON WHEN CIRCUMSTANCES PERMIT: 1
HOW LIKELY ARE YOU TO NOD OFF OR FALL ASLEEP WHILE SITTING AND READING: 0
HOW LIKELY ARE YOU TO NOD OFF OR FALL ASLEEP WHILE SITTING QUIETLY AFTER LUNCH WITHOUT ALCOHOL: 0

## 2023-06-12 ENCOUNTER — TELEPHONE (OUTPATIENT)
Dept: ENDOCRINOLOGY | Age: 33
End: 2023-06-12

## 2023-06-14 ENCOUNTER — V-VISIT (OUTPATIENT)
Dept: PULMONOLOGY | Age: 33
End: 2023-06-14
Attending: NURSE PRACTITIONER

## 2023-06-14 DIAGNOSIS — G47.30 SLEEP APNEA, UNSPECIFIED TYPE: Primary | ICD-10-CM

## 2023-06-14 PROCEDURE — 99244 OFF/OP CNSLTJ NEW/EST MOD 40: CPT | Performed by: INTERNAL MEDICINE

## 2023-06-14 ASSESSMENT — SLEEP AND FATIGUE QUESTIONNAIRES
HOW LIKELY ARE YOU TO NOD OFF OR FALL ASLEEP WHILE SITTING AND READING: 3
HOW LIKELY ARE YOU TO NOD OFF OR FALL ASLEEP WHILE WATCHING TV: 3
HOW LIKELY ARE YOU TO NOD OFF OR FALL ASLEEP WHILE LYING DOWN TO REST IN THE AFTERNOON WHEN CIRCUMSTANCES PERMIT: 2
ESS TOTAL SCORE: 15
HOW LIKELY ARE YOU TO NOD OFF OR FALL ASLEEP WHILE SITTING INACTIVE IN A PUBLIC PLACE: 2
HOW LIKELY ARE YOU TO NOD OFF OR FALL ASLEEP WHEN YOU ARE A PASSENGER IN A CAR FOR AN HOUR WITHOUT A BREAK: 3
HOW LIKELY ARE YOU TO NOD OFF OR FALL ASLEEP IN A CAR, WHILE STOPPED FOR A FEW MINUTES IN TRAFFIC: 0
HOW LIKELY ARE YOU TO NOD OFF OR FALL ASLEEP WHILE SITTING QUIETLY AFTER LUNCH WITHOUT ALCOHOL: 2
HOW LIKELY ARE YOU TO NOD OFF OR FALL ASLEEP WHILE SITTING AND TALKING TO SOMEONE: 0

## 2023-06-16 ENCOUNTER — NURSE ONLY (OUTPATIENT)
Dept: ENDOCRINOLOGY | Age: 33
End: 2023-06-16

## 2023-06-16 DIAGNOSIS — E66.01 CLASS 3 SEVERE OBESITY DUE TO EXCESS CALORIES WITH SERIOUS COMORBIDITY AND BODY MASS INDEX (BMI) OF 50.0 TO 59.9 IN ADULT (CMD): ICD-10-CM

## 2023-06-16 DIAGNOSIS — E88.819 INSULIN RESISTANCE: Primary | ICD-10-CM

## 2023-07-06 ENCOUNTER — TELEPHONE (OUTPATIENT)
Dept: ENDOCRINOLOGY | Age: 33
End: 2023-07-06

## 2023-07-10 ENCOUNTER — LAB SERVICES (OUTPATIENT)
Dept: LAB | Age: 33
End: 2023-07-10

## 2023-07-10 DIAGNOSIS — E55.9 VITAMIN D DEFICIENCY: ICD-10-CM

## 2023-07-10 PROCEDURE — 99000 SPECIMEN HANDLING OFFICE-LAB: CPT | Performed by: INTERNAL MEDICINE

## 2023-07-10 PROCEDURE — 82306 VITAMIN D 25 HYDROXY: CPT | Performed by: CLINICAL MEDICAL LABORATORY

## 2023-07-11 LAB — 25(OH)D3+25(OH)D2 SERPL-MCNC: 41.8 NG/ML (ref 30–100)

## 2023-07-18 ENCOUNTER — OFFICE VISIT (OUTPATIENT)
Dept: ENDOCRINOLOGY | Age: 33
End: 2023-07-18

## 2023-07-18 VITALS
WEIGHT: 270.6 LBS | BODY MASS INDEX: 47.95 KG/M2 | DIASTOLIC BLOOD PRESSURE: 78 MMHG | HEIGHT: 63 IN | SYSTOLIC BLOOD PRESSURE: 118 MMHG | HEART RATE: 90 BPM

## 2023-07-18 DIAGNOSIS — E66.01 CLASS 3 SEVERE OBESITY DUE TO EXCESS CALORIES WITH SERIOUS COMORBIDITY AND BODY MASS INDEX (BMI) OF 45.0 TO 49.9 IN ADULT (CMD): ICD-10-CM

## 2023-07-18 DIAGNOSIS — G89.29 CHRONIC MIDLINE LOW BACK PAIN WITHOUT SCIATICA: ICD-10-CM

## 2023-07-18 DIAGNOSIS — F32.A ANXIETY AND DEPRESSION: ICD-10-CM

## 2023-07-18 DIAGNOSIS — R29.818 SUSPECTED SLEEP APNEA: ICD-10-CM

## 2023-07-18 DIAGNOSIS — J45.20 MILD INTERMITTENT ASTHMA WITHOUT COMPLICATION: ICD-10-CM

## 2023-07-18 DIAGNOSIS — F41.9 ANXIETY AND DEPRESSION: ICD-10-CM

## 2023-07-18 DIAGNOSIS — M54.50 CHRONIC MIDLINE LOW BACK PAIN WITHOUT SCIATICA: ICD-10-CM

## 2023-07-18 DIAGNOSIS — E55.9 VITAMIN D DEFICIENCY: ICD-10-CM

## 2023-07-18 DIAGNOSIS — E88.819 INSULIN RESISTANCE: Primary | ICD-10-CM

## 2023-07-18 PROCEDURE — 99214 OFFICE O/P EST MOD 30 MIN: CPT | Performed by: NURSE PRACTITIONER

## 2023-07-18 RX ORDER — CHOLECALCIFEROL (VITAMIN D3) 125 MCG
125 CAPSULE ORAL DAILY
Qty: 90 CAPSULE | Refills: 0 | Status: SHIPPED | OUTPATIENT
Start: 2023-07-18 | End: 2023-10-24 | Stop reason: SDUPTHER

## 2023-07-18 RX ORDER — ONDANSETRON 8 MG/1
TABLET, ORALLY DISINTEGRATING ORAL
COMMUNITY

## 2023-07-18 RX ORDER — LEVONORGESTREL AND ETHINYL ESTRADIOL AND ETHINYL ESTRADIOL 150-30(84)
KIT ORAL
COMMUNITY
Start: 2023-06-20 | End: 2023-07-18 | Stop reason: SDUPTHER

## 2023-07-18 RX ORDER — LEVONORGESTREL / ETHINYL ESTRADIOL AND ETHINYL ESTRADIOL 150-30(84)
KIT ORAL
COMMUNITY
Start: 2023-06-20

## 2023-07-18 RX ORDER — BENZONATATE 100 MG/1
CAPSULE ORAL
COMMUNITY
Start: 2023-06-09 | End: 2023-10-24 | Stop reason: ALTCHOICE

## 2023-07-18 RX ORDER — FLUTICASONE PROPIONATE 50 MCG
SPRAY, SUSPENSION (ML) NASAL
COMMUNITY

## 2023-08-09 ENCOUNTER — HOSPITAL ENCOUNTER (OUTPATIENT)
Dept: SLEEP MEDICINE | Age: 33
Discharge: HOME | End: 2023-08-09
Attending: INTERNAL MEDICINE

## 2023-08-09 DIAGNOSIS — G47.30 SLEEP APNEA, UNSPECIFIED TYPE: ICD-10-CM

## 2023-08-09 PROCEDURE — G0399 HOME SLEEP TEST/TYPE 3 PORTA: HCPCS | Performed by: INTERNAL MEDICINE

## 2023-08-09 PROCEDURE — G0399 HOME SLEEP TEST/TYPE 3 PORTA: HCPCS

## 2023-08-21 ENCOUNTER — TELEPHONE (OUTPATIENT)
Dept: PULMONOLOGY | Age: 33
End: 2023-08-21

## 2023-08-29 ENCOUNTER — OFFICE VISIT (OUTPATIENT)
Dept: FAMILY MEDICINE | Age: 33
End: 2023-08-29

## 2023-08-29 VITALS
BODY MASS INDEX: 46.25 KG/M2 | DIASTOLIC BLOOD PRESSURE: 84 MMHG | SYSTOLIC BLOOD PRESSURE: 130 MMHG | HEIGHT: 63 IN | HEART RATE: 88 BPM | WEIGHT: 261 LBS

## 2023-08-29 DIAGNOSIS — E65 ABDOMINAL PANNICULUS, SYMPTOMATIC: ICD-10-CM

## 2023-08-29 DIAGNOSIS — J45.20 MILD INTERMITTENT ASTHMA WITHOUT COMPLICATION: ICD-10-CM

## 2023-08-29 DIAGNOSIS — M54.50 CHRONIC MIDLINE LOW BACK PAIN WITHOUT SCIATICA: Primary | ICD-10-CM

## 2023-08-29 DIAGNOSIS — E66.01 CLASS 3 SEVERE OBESITY DUE TO EXCESS CALORIES WITH SERIOUS COMORBIDITY AND BODY MASS INDEX (BMI) OF 45.0 TO 49.9 IN ADULT (CMD): ICD-10-CM

## 2023-08-29 DIAGNOSIS — G89.29 CHRONIC MIDLINE LOW BACK PAIN WITHOUT SCIATICA: Primary | ICD-10-CM

## 2023-08-29 PROBLEM — E66.813 CLASS 3 SEVERE OBESITY DUE TO EXCESS CALORIES WITH SERIOUS COMORBIDITY AND BODY MASS INDEX (BMI) OF 45.0 TO 49.9 IN ADULT (CMD): Status: ACTIVE | Noted: 2021-03-26

## 2023-08-29 PROBLEM — E66.9 ADIPOSITY: Status: ACTIVE | Noted: 2021-01-14

## 2023-08-29 PROCEDURE — 99214 OFFICE O/P EST MOD 30 MIN: CPT | Performed by: FAMILY MEDICINE

## 2023-09-04 DIAGNOSIS — E55.9 VITAMIN D DEFICIENCY: ICD-10-CM

## 2023-09-05 RX ORDER — ERGOCALCIFEROL 1.25 MG/1
CAPSULE ORAL
Qty: 12 CAPSULE | Refills: 0 | OUTPATIENT
Start: 2023-09-05

## 2023-09-14 DIAGNOSIS — E88.819 INSULIN RESISTANCE: ICD-10-CM

## 2023-09-14 DIAGNOSIS — E66.01 CLASS 3 SEVERE OBESITY DUE TO EXCESS CALORIES WITH SERIOUS COMORBIDITY AND BODY MASS INDEX (BMI) OF 50.0 TO 59.9 IN ADULT (CMD): ICD-10-CM

## 2023-09-18 ENCOUNTER — E-ADVICE (OUTPATIENT)
Dept: ENDOCRINOLOGY | Age: 33
End: 2023-09-18

## 2023-10-17 ENCOUNTER — LAB SERVICES (OUTPATIENT)
Dept: LAB | Age: 33
End: 2023-10-17

## 2023-10-17 DIAGNOSIS — E66.01 CLASS 3 SEVERE OBESITY DUE TO EXCESS CALORIES WITH SERIOUS COMORBIDITY AND BODY MASS INDEX (BMI) OF 45.0 TO 49.9 IN ADULT (CMD): ICD-10-CM

## 2023-10-17 DIAGNOSIS — E88.819 INSULIN RESISTANCE: ICD-10-CM

## 2023-10-17 LAB
ANION GAP SERPL CALC-SCNC: 14 MMOL/L (ref 7–19)
BUN SERPL-MCNC: 9 MG/DL (ref 6–20)
BUN/CREAT SERPL: 12 (ref 7–25)
CALCIUM SERPL-MCNC: 9 MG/DL (ref 8.4–10.2)
CHLORIDE SERPL-SCNC: 106 MMOL/L (ref 97–110)
CHOLEST SERPL-MCNC: 171 MG/DL
CHOLEST/HDLC SERPL: 3.4 {RATIO}
CO2 SERPL-SCNC: 24 MMOL/L (ref 21–32)
CREAT SERPL-MCNC: 0.78 MG/DL (ref 0.51–0.95)
EGFRCR SERPLBLD CKD-EPI 2021: >90 ML/MIN/{1.73_M2}
FASTING DURATION TIME PATIENT: 12 HOURS (ref 0–999)
FASTING DURATION TIME PATIENT: NORMAL H
GLUCOSE SERPL-MCNC: 93 MG/DL (ref 70–99)
HDLC SERPL-MCNC: 51 MG/DL
INSULIN P FAST SERPL-ACNC: 11 MUNITS/L (ref 3–28)
LDLC SERPL CALC-MCNC: 107 MG/DL
NONHDLC SERPL-MCNC: 120 MG/DL
POTASSIUM SERPL-SCNC: 4.2 MMOL/L (ref 3.4–5.1)
SODIUM SERPL-SCNC: 140 MMOL/L (ref 135–145)
TRIGL SERPL-MCNC: 63 MG/DL
TSH SERPL-ACNC: 1.88 MCUNITS/ML (ref 0.35–5)
URATE SERPL-MCNC: 4.5 MG/DL (ref 2.6–5.9)

## 2023-10-17 PROCEDURE — 84550 ASSAY OF BLOOD/URIC ACID: CPT | Performed by: CLINICAL MEDICAL LABORATORY

## 2023-10-17 PROCEDURE — 80061 LIPID PANEL: CPT | Performed by: CLINICAL MEDICAL LABORATORY

## 2023-10-17 PROCEDURE — 84443 ASSAY THYROID STIM HORMONE: CPT | Performed by: CLINICAL MEDICAL LABORATORY

## 2023-10-17 PROCEDURE — 99000 SPECIMEN HANDLING OFFICE-LAB: CPT | Performed by: INTERNAL MEDICINE

## 2023-10-17 PROCEDURE — 80048 BASIC METABOLIC PNL TOTAL CA: CPT | Performed by: INTERNAL MEDICINE

## 2023-10-17 PROCEDURE — 83525 ASSAY OF INSULIN: CPT | Performed by: CLINICAL MEDICAL LABORATORY

## 2023-10-24 ENCOUNTER — OFFICE VISIT (OUTPATIENT)
Dept: ENDOCRINOLOGY | Age: 33
End: 2023-10-24

## 2023-10-24 VITALS
SYSTOLIC BLOOD PRESSURE: 130 MMHG | BODY MASS INDEX: 46.39 KG/M2 | HEART RATE: 95 BPM | WEIGHT: 261.8 LBS | DIASTOLIC BLOOD PRESSURE: 76 MMHG | HEIGHT: 63 IN

## 2023-10-24 DIAGNOSIS — E55.9 VITAMIN D DEFICIENCY: ICD-10-CM

## 2023-10-24 DIAGNOSIS — R29.818 SUSPECTED SLEEP APNEA: ICD-10-CM

## 2023-10-24 DIAGNOSIS — F41.9 ANXIETY AND DEPRESSION: ICD-10-CM

## 2023-10-24 DIAGNOSIS — F32.A ANXIETY AND DEPRESSION: ICD-10-CM

## 2023-10-24 DIAGNOSIS — G89.29 CHRONIC MIDLINE LOW BACK PAIN WITHOUT SCIATICA: ICD-10-CM

## 2023-10-24 DIAGNOSIS — E66.01 CLASS 3 SEVERE OBESITY DUE TO EXCESS CALORIES WITH SERIOUS COMORBIDITY AND BODY MASS INDEX (BMI) OF 45.0 TO 49.9 IN ADULT (CMD): ICD-10-CM

## 2023-10-24 DIAGNOSIS — E66.01 CLASS 3 SEVERE OBESITY DUE TO EXCESS CALORIES WITH SERIOUS COMORBIDITY AND BODY MASS INDEX (BMI) OF 50.0 TO 59.9 IN ADULT (CMD): ICD-10-CM

## 2023-10-24 DIAGNOSIS — J45.20 MILD INTERMITTENT ASTHMA WITHOUT COMPLICATION: ICD-10-CM

## 2023-10-24 DIAGNOSIS — E88.819 INSULIN RESISTANCE: Primary | ICD-10-CM

## 2023-10-24 DIAGNOSIS — M54.50 CHRONIC MIDLINE LOW BACK PAIN WITHOUT SCIATICA: ICD-10-CM

## 2023-10-24 PROCEDURE — 99214 OFFICE O/P EST MOD 30 MIN: CPT | Performed by: NURSE PRACTITIONER

## 2023-10-24 RX ORDER — CHOLECALCIFEROL (VITAMIN D3) 125 MCG
125 CAPSULE ORAL DAILY
Qty: 90 CAPSULE | Refills: 0 | Status: SHIPPED | OUTPATIENT
Start: 2023-10-24

## 2023-10-25 ENCOUNTER — TELEPHONE (OUTPATIENT)
Dept: ENDOCRINOLOGY | Age: 33
End: 2023-10-25

## 2023-10-25 DIAGNOSIS — E66.01 CLASS 3 SEVERE OBESITY DUE TO EXCESS CALORIES WITH SERIOUS COMORBIDITY AND BODY MASS INDEX (BMI) OF 45.0 TO 49.9 IN ADULT (CMD): Primary | ICD-10-CM

## 2023-10-25 RX ORDER — TOPIRAMATE 25 MG/1
TABLET ORAL
Qty: 180 TABLET | Refills: 0 | Status: SHIPPED | OUTPATIENT
Start: 2023-10-25

## 2023-12-06 ENCOUNTER — APPOINTMENT (OUTPATIENT)
Dept: ENDOCRINOLOGY | Age: 33
End: 2023-12-06

## 2024-07-24 ENCOUNTER — E-ADVICE (OUTPATIENT)
Dept: ENDOCRINOLOGY | Age: 34
End: 2024-07-24

## 2025-01-06 DIAGNOSIS — E88.819 INSULIN RESISTANCE: ICD-10-CM

## 2025-01-07 RX ORDER — LIRAGLUTIDE 6 MG/ML
INJECTION, SOLUTION SUBCUTANEOUS
Qty: 45 ML | Refills: 0 | OUTPATIENT
Start: 2025-01-07

## 2025-07-07 DIAGNOSIS — E66.813 CLASS 3 SEVERE OBESITY DUE TO EXCESS CALORIES WITH SERIOUS COMORBIDITY AND BODY MASS INDEX (BMI) OF 45.0 TO 49.9 IN ADULT: ICD-10-CM

## 2025-07-07 DIAGNOSIS — E88.819 INSULIN RESISTANCE: ICD-10-CM

## 2025-07-07 DIAGNOSIS — E66.813 CLASS 3 SEVERE OBESITY DUE TO EXCESS CALORIES WITH SERIOUS COMORBIDITY AND BODY MASS INDEX (BMI) OF 50.0 TO 59.9 IN ADULT: ICD-10-CM

## 2025-07-07 DIAGNOSIS — Z71.6 ENCOUNTER FOR SMOKING CESSATION COUNSELING: ICD-10-CM

## 2025-07-07 RX ORDER — BUPROPION HYDROCHLORIDE 100 MG/1
100 TABLET, EXTENDED RELEASE ORAL 2 TIMES DAILY
Qty: 60 TABLET | Refills: 1 | OUTPATIENT
Start: 2025-07-07

## 2025-07-07 RX ORDER — TOPIRAMATE 25 MG/1
TABLET, FILM COATED ORAL
Qty: 180 TABLET | Refills: 0 | OUTPATIENT
Start: 2025-07-07

## (undated) DEVICE — SUT VIC 3/0 CTI 36IN J944H

## (undated) DEVICE — GLV SURG TRIUMPH LT PF LTX 8 STRL

## (undated) DEVICE — WOUND RETRACTOR AND PROTECTOR: Brand: ALEXIS O WOUND PROTECTOR-RETRACTOR

## (undated) DEVICE — SUT VIC 0 CTX 36IN J978H

## (undated) DEVICE — 3M™ STERI-STRIP™ REINFORCED ADHESIVE SKIN CLOSURES, R1547, 1/2 IN X 4 IN (12 MM X 100 MM), 6 STRIPS/ENVELOPE: Brand: 3M™ STERI-STRIP™

## (undated) DEVICE — DRSNG TELFA PAD NONADH STR 1S 3X8IN

## (undated) DEVICE — SUT MNCRYL 0/0 CTX 36IN Y398H

## (undated) DEVICE — POOLE SUCTION INSTRUMENT WITH REMOVABLE SHEATH: Brand: POOLE

## (undated) DEVICE — APPL HEMO SURG ARISTA/AH/FLEXITIP XL 38CM

## (undated) DEVICE — TRY SPINE PENCAN 24GA X4IN

## (undated) DEVICE — SYS SKIN CLS DERMABOND PRINEO W/22CM MESH TP

## (undated) DEVICE — SUT MNCRYL 3/0 Y936H

## (undated) DEVICE — GLV SURG SENSICARE W/ALOE PF LF 8.5 STRL

## (undated) DEVICE — ADHS LIQ MASTISOL 2/3ML

## (undated) DEVICE — PK C/SECT 60

## (undated) DEVICE — GOWN,PREVENTION PLUS,XLNG/XXLARGE,STRL: Brand: MEDLINE

## (undated) DEVICE — PAD,ABDOMINAL,8"X10",ST,LF: Brand: MEDLINE

## (undated) DEVICE — GARMENT,MEDLINE,DVT,INT,CALF,MED, GEN2: Brand: MEDLINE